# Patient Record
Sex: MALE | Race: WHITE | NOT HISPANIC OR LATINO | Employment: OTHER | URBAN - METROPOLITAN AREA
[De-identification: names, ages, dates, MRNs, and addresses within clinical notes are randomized per-mention and may not be internally consistent; named-entity substitution may affect disease eponyms.]

---

## 2018-02-21 ENCOUNTER — OFFICE VISIT CONVERTED (OUTPATIENT)
Dept: SURGERY | Facility: CLINIC | Age: 68
End: 2018-02-21
Attending: SURGERY

## 2018-04-19 ENCOUNTER — OFFICE VISIT CONVERTED (OUTPATIENT)
Dept: SURGERY | Facility: CLINIC | Age: 68
End: 2018-04-19
Attending: SURGERY

## 2019-01-07 ENCOUNTER — HOSPITAL ENCOUNTER (OUTPATIENT)
Dept: LAB | Facility: HOSPITAL | Age: 69
Discharge: HOME | End: 2019-01-07
Attending: INTERNAL MEDICINE

## 2019-01-07 LAB
ALBUMIN SERPL-MCNC: 4.6 G/DL (ref 3.5–5)
ALBUMIN/GLOB SERPL: 1.4 {RATIO} (ref 1.4–2.6)
ALP SERPL-CCNC: 102 U/L (ref 56–155)
ALT SERPL-CCNC: 38 U/L (ref 10–40)
ANION GAP SERPL CALC-SCNC: 22 MMOL/L (ref 8–19)
AST SERPL-CCNC: 23 U/L (ref 15–50)
BASOPHILS # BLD AUTO: 0.01 10*3/UL (ref 0–0.2)
BASOPHILS NFR BLD AUTO: 0.23 % (ref 0–3)
BILIRUB SERPL-MCNC: 0.43 MG/DL (ref 0.2–1.3)
BUN SERPL-MCNC: 20 MG/DL (ref 5–25)
BUN/CREAT SERPL: 20 {RATIO} (ref 6–20)
CALCIUM SERPL-MCNC: 10 MG/DL (ref 8.7–10.4)
CHLORIDE SERPL-SCNC: 102 MMOL/L (ref 99–111)
CHOLEST SERPL-MCNC: 171 MG/DL (ref 107–200)
CHOLEST/HDLC SERPL: 4.5 {RATIO} (ref 3–6)
CONV CO2: 20 MMOL/L (ref 22–32)
CONV TOTAL PROTEIN: 7.9 G/DL (ref 6.3–8.2)
CREAT UR-MCNC: 0.99 MG/DL (ref 0.7–1.2)
EOSINOPHIL # BLD AUTO: 0.22 10*3/UL (ref 0–0.7)
EOSINOPHIL # BLD AUTO: 3.61 % (ref 0–7)
ERYTHROCYTE [DISTWIDTH] IN BLOOD BY AUTOMATED COUNT: 11.5 % (ref 11.5–14.5)
GFR SERPLBLD BASED ON 1.73 SQ M-ARVRAT: >60 ML/MIN/{1.73_M2}
GLOBULIN UR ELPH-MCNC: 3.3 G/DL (ref 2–3.5)
GLUCOSE SERPL-MCNC: 96 MG/DL (ref 70–99)
HBA1C MFR BLD: 13.5 G/DL (ref 14–18)
HCT VFR BLD AUTO: 38.6 % (ref 42–52)
HDLC SERPL-MCNC: 38 MG/DL (ref 40–60)
LDLC SERPL CALC-MCNC: 67 MG/DL (ref 70–100)
LYMPHOCYTES # BLD AUTO: 1.41 10*3/UL (ref 1–5)
MCH RBC QN AUTO: 31.5 PG (ref 27–31)
MCHC RBC AUTO-ENTMCNC: 35 G/DL (ref 33–37)
MCV RBC AUTO: 90 FL (ref 80–96)
MONOCYTES # BLD AUTO: 0.49 10*3/UL (ref 0.2–1.2)
MONOCYTES NFR BLD AUTO: 8.15 % (ref 3–10)
NEUTROPHILS # BLD AUTO: 3.9 10*3/UL (ref 2–8)
NEUTROPHILS NFR BLD AUTO: 64.6 % (ref 30–85)
NRBC BLD AUTO-RTO: 0 % (ref 0–0.01)
OSMOLALITY SERPL CALC.SUM OF ELEC: 292 MOSM/KG (ref 273–304)
PLATELET # BLD AUTO: 295 10*3/UL (ref 130–400)
PMV BLD AUTO: 6.3 FL (ref 7.4–10.4)
POTASSIUM SERPL-SCNC: 4.4 MMOL/L (ref 3.5–5.3)
PSA SERPL-MCNC: 1.77 NG/ML (ref 0–4)
RBC # BLD AUTO: 4.29 10*6/UL (ref 4.7–6.1)
SODIUM SERPL-SCNC: 140 MMOL/L (ref 135–147)
TRIGL SERPL-MCNC: 332 MG/DL (ref 40–150)
TSH SERPL-ACNC: 2.42 M[IU]/L (ref 0.27–4.2)
VARIANT LYMPHS NFR BLD MANUAL: 23.4 % (ref 20–45)
VLDLC SERPL-MCNC: 66 MG/DL (ref 5–37)
WBC # BLD AUTO: 6.04 10*3/UL (ref 4.8–10.8)

## 2020-03-06 ENCOUNTER — HOSPITAL ENCOUNTER (OUTPATIENT)
Dept: LAB | Facility: HOSPITAL | Age: 70
Discharge: HOME | End: 2020-03-06
Attending: INTERNAL MEDICINE

## 2020-03-06 LAB
ALBUMIN SERPL-MCNC: 4.7 G/DL (ref 3.5–5)
ALBUMIN/GLOB SERPL: 1.5 {RATIO} (ref 1.4–2.6)
ALP SERPL-CCNC: 93 U/L (ref 56–155)
ALT SERPL-CCNC: 58 U/L (ref 10–40)
ANION GAP SERPL CALC-SCNC: 20 MMOL/L (ref 8–19)
AST SERPL-CCNC: 37 U/L (ref 15–50)
BASOPHILS # BLD AUTO: 0.03 10*3/UL (ref 0–0.2)
BASOPHILS NFR BLD AUTO: 0.5 % (ref 0–3)
BILIRUB SERPL-MCNC: 0.39 MG/DL (ref 0.2–1.3)
BUN SERPL-MCNC: 24 MG/DL (ref 5–25)
BUN/CREAT SERPL: 24 {RATIO} (ref 6–20)
CALCIUM SERPL-MCNC: 9.8 MG/DL (ref 8.7–10.4)
CHLORIDE SERPL-SCNC: 101 MMOL/L (ref 99–111)
CHOLEST SERPL-MCNC: 202 MG/DL (ref 107–200)
CHOLEST/HDLC SERPL: 5.6 {RATIO} (ref 3–6)
CONV ABS IMM GRAN: 0.01 10*3/UL (ref 0–0.2)
CONV CO2: 20 MMOL/L (ref 22–32)
CONV IMMATURE GRAN: 0.2 % (ref 0–1.8)
CONV TOTAL PROTEIN: 7.8 G/DL (ref 6.3–8.2)
CREAT UR-MCNC: 1.01 MG/DL (ref 0.7–1.2)
DEPRECATED RDW RBC AUTO: 43.3 FL (ref 35.1–43.9)
EOSINOPHIL # BLD AUTO: 0.3 10*3/UL (ref 0–0.7)
EOSINOPHIL # BLD AUTO: 4.9 % (ref 0–7)
ERYTHROCYTE [DISTWIDTH] IN BLOOD BY AUTOMATED COUNT: 12.5 % (ref 11.6–14.4)
GFR SERPLBLD BASED ON 1.73 SQ M-ARVRAT: >60 ML/MIN/{1.73_M2}
GLOBULIN UR ELPH-MCNC: 3.1 G/DL (ref 2–3.5)
GLUCOSE SERPL-MCNC: 96 MG/DL (ref 70–99)
HCT VFR BLD AUTO: 41.4 % (ref 42–52)
HDLC SERPL-MCNC: 36 MG/DL (ref 40–60)
HGB BLD-MCNC: 13.6 G/DL (ref 14–18)
LDLC SERPL CALC-MCNC: 92 MG/DL (ref 70–100)
LYMPHOCYTES # BLD AUTO: 1.21 10*3/UL (ref 1–5)
LYMPHOCYTES NFR BLD AUTO: 19.8 % (ref 20–45)
MCH RBC QN AUTO: 30.8 PG (ref 27–31)
MCHC RBC AUTO-ENTMCNC: 32.9 G/DL (ref 33–37)
MCV RBC AUTO: 93.9 FL (ref 80–96)
MONOCYTES # BLD AUTO: 0.52 10*3/UL (ref 0.2–1.2)
MONOCYTES NFR BLD AUTO: 8.5 % (ref 3–10)
NEUTROPHILS # BLD AUTO: 4.04 10*3/UL (ref 2–8)
NEUTROPHILS NFR BLD AUTO: 66.1 % (ref 30–85)
NRBC CBCN: 0 % (ref 0–0.7)
OSMOLALITY SERPL CALC.SUM OF ELEC: 288 MOSM/KG (ref 273–304)
PLATELET # BLD AUTO: 214 10*3/UL (ref 130–400)
PMV BLD AUTO: 9.3 FL (ref 9.4–12.4)
POTASSIUM SERPL-SCNC: 4.2 MMOL/L (ref 3.5–5.3)
PSA SERPL-MCNC: 2.02 NG/ML (ref 0–4)
RBC # BLD AUTO: 4.41 10*6/UL (ref 4.7–6.1)
SODIUM SERPL-SCNC: 137 MMOL/L (ref 135–147)
TRIGL SERPL-MCNC: 371 MG/DL (ref 40–150)
VLDLC SERPL-MCNC: 74 MG/DL (ref 5–37)
WBC # BLD AUTO: 6.11 10*3/UL (ref 4.8–10.8)

## 2021-01-15 ENCOUNTER — HOSPITAL ENCOUNTER (OUTPATIENT)
Dept: LAB | Facility: HOSPITAL | Age: 71
Discharge: HOME | End: 2021-01-15
Attending: INTERNAL MEDICINE

## 2021-01-15 LAB
ALBUMIN SERPL-MCNC: 4.5 G/DL (ref 3.5–5)
ALBUMIN/GLOB SERPL: 1.5 {RATIO} (ref 1.4–2.6)
ALP SERPL-CCNC: 87 U/L (ref 56–155)
ALT SERPL-CCNC: 33 U/L (ref 10–40)
ANION GAP SERPL CALC-SCNC: 16 MMOL/L (ref 8–19)
AST SERPL-CCNC: 25 U/L (ref 15–50)
BASOPHILS # BLD AUTO: 0.04 10*3/UL (ref 0–0.2)
BASOPHILS NFR BLD AUTO: 0.8 % (ref 0–3)
BILIRUB SERPL-MCNC: 0.41 MG/DL (ref 0.2–1.3)
BUN SERPL-MCNC: 22 MG/DL (ref 5–25)
BUN/CREAT SERPL: 19 {RATIO} (ref 6–20)
CALCIUM SERPL-MCNC: 10.3 MG/DL (ref 8.7–10.4)
CHLORIDE SERPL-SCNC: 103 MMOL/L (ref 99–111)
CONV ABS IMM GRAN: 0.02 10*3/UL (ref 0–0.2)
CONV CO2: 23 MMOL/L (ref 22–32)
CONV IMMATURE GRAN: 0.4 % (ref 0–1.8)
CONV TOTAL PROTEIN: 7.6 G/DL (ref 6.3–8.2)
CREAT UR-MCNC: 1.15 MG/DL (ref 0.7–1.2)
DEPRECATED RDW RBC AUTO: 44.1 FL (ref 35.1–43.9)
EOSINOPHIL # BLD AUTO: 0.23 10*3/UL (ref 0–0.7)
EOSINOPHIL # BLD AUTO: 4.6 % (ref 0–7)
ERYTHROCYTE [DISTWIDTH] IN BLOOD BY AUTOMATED COUNT: 12.9 % (ref 11.6–14.4)
GFR SERPLBLD BASED ON 1.73 SQ M-ARVRAT: >60 ML/MIN/{1.73_M2}
GLOBULIN UR ELPH-MCNC: 3.1 G/DL (ref 2–3.5)
GLUCOSE SERPL-MCNC: 99 MG/DL (ref 70–99)
HCT VFR BLD AUTO: 40.5 % (ref 42–52)
HGB BLD-MCNC: 13.5 G/DL (ref 14–18)
IRON SATN MFR SERPL: 21 % (ref 20–55)
IRON SERPL-MCNC: 88 UG/DL (ref 70–180)
LYMPHOCYTES # BLD AUTO: 1.16 10*3/UL (ref 1–5)
LYMPHOCYTES NFR BLD AUTO: 23.4 % (ref 20–45)
MCH RBC QN AUTO: 31 PG (ref 27–31)
MCHC RBC AUTO-ENTMCNC: 33.3 G/DL (ref 33–37)
MCV RBC AUTO: 93.1 FL (ref 80–96)
MONOCYTES # BLD AUTO: 0.51 10*3/UL (ref 0.2–1.2)
MONOCYTES NFR BLD AUTO: 10.3 % (ref 3–10)
NEUTROPHILS # BLD AUTO: 2.99 10*3/UL (ref 2–8)
NEUTROPHILS NFR BLD AUTO: 60.5 % (ref 30–85)
NRBC CBCN: 0 % (ref 0–0.7)
OSMOLALITY SERPL CALC.SUM OF ELEC: 289 MOSM/KG (ref 273–304)
PLATELET # BLD AUTO: 177 10*3/UL (ref 130–400)
PMV BLD AUTO: 9.7 FL (ref 9.4–12.4)
POTASSIUM SERPL-SCNC: 4.2 MMOL/L (ref 3.5–5.3)
RBC # BLD AUTO: 4.35 10*6/UL (ref 4.7–6.1)
SODIUM SERPL-SCNC: 138 MMOL/L (ref 135–147)
TIBC SERPL-MCNC: 423 UG/DL (ref 245–450)
TRANSFERRIN SERPL-MCNC: 296 MG/DL (ref 215–365)
WBC # BLD AUTO: 4.95 10*3/UL (ref 4.8–10.8)

## 2021-01-16 LAB
CERULOPLASMIN SERPL-MCNC: 24.4 MG/DL (ref 16–31)
HCV AB SER DONR QL: <0.1 S/CO RATIO (ref 0–0.9)
SMOOTH MUSCLE F-ACTIN AB IGG: 6 UNITS (ref 0–19)

## 2021-01-19 LAB — CONV HEPATITIS B SURFACE AG W CONFIRMATION RE: NEGATIVE

## 2021-02-25 ENCOUNTER — CONVERSION ENCOUNTER (OUTPATIENT)
Dept: GASTROENTEROLOGY | Facility: CLINIC | Age: 71
End: 2021-02-25
Attending: INTERNAL MEDICINE

## 2021-03-10 ENCOUNTER — HOSPITAL ENCOUNTER (OUTPATIENT)
Dept: LAB | Facility: HOSPITAL | Age: 71
Discharge: HOME | End: 2021-03-10
Attending: INTERNAL MEDICINE

## 2021-03-11 LAB — PSA SERPL-MCNC: 2.25 NG/ML (ref 0–4)

## 2021-03-30 ENCOUNTER — OFFICE VISIT CONVERTED (OUTPATIENT)
Dept: SURGERY | Facility: CLINIC | Age: 71
End: 2021-03-30
Attending: NURSE PRACTITIONER

## 2021-05-10 NOTE — H&P
History and Physical      Patient Name: Herb Dimas   Patient ID: [id number]   Sex: Male   YOB: 1950    Referring Provider: Clemente Park MD    Visit Date: February 25, 2021    Provider: Bryn Adamson MD   Location: [facility]   Location Address: [address] [place] [address]  [id number]   Location Phone: [phone]          Chief Complaint  · Surgical History and Physical  · Screening Colonoscopy      History Of Present Illness  NON-INPATIENT HISTORY AND PHYSICAL  Allergies: NO KNOWN DRUG ALLERGIES   Chief Complaint/History of Present Illness: Colon Screening, History of colon polyps, No Family history of colon caner   Colon Recall: No   Failed Outpatient Treatment/Contraindications: N/A   Current Medications: amlodipine 5 mg oral tablet, aspirin 81 mg oral tablet,delayed release (DR/EC), chlorthalidone 25 mg oral tablet, irbesartan 300 mg oral tablet, NuLYTELY with Flavor Packs 420 gram oral recon soln, rosuvastatin 20 mg oral tablet, and Vitamin D3 oral   Significant Past Medical History: Arthritis, High blood pressure, and High cholesterol   Significant Family Medical History: No Family history of colon cancer   Significant Past Surgical History: Appendectomy, Colonoscopy, and Knee Replacement   Previous Colonoscopy: Yes YEAR: By Whom:   Previous EGD: Yes YEAR: 2000 By Whom:   PHYSICAL EXAM:  Heart: Regular Rate and Rhythm   Lungs: Breathing Unlabored           Assessment  · Preoperative examination     V72.84/Z01.818  · Screening for colon cancer     V76.51/Z12.11  · History of colon polyps     V12.72/Z86.010      Plan  · Orders  o Consent for Colonoscopy with Possible Biopsy - Possible risks/complications, benefits, and alternatives to surgical or invasive procedure have been explained to patient and/or legal guardian. -Patient has been evaluated and can tolerate anesthesia and/or sedation. Risks, benefits, and alternatives to anesthesia and sedation  have been explained to patient and/or legal guardian. ([id number]) - V72.84/Z01.818, V76.51/Z12.11, V12.72/Z86.010 - 04/21/2021  · Medications  o Medications have been Reconciled  o Transition of Care or Provider Policy  · Instructions  o ****Surgical Orders****  o ***************  o Outpatient  o ***************  o RISK AND BENEFITS:  o Possible risks/complications, benefits and alternatives to surgical or invasive procedure have been explained to the patient and/or legal guardian.  o Patient has been evaluated and can tolerate anesthesia and/or sedation. Risks, benefits, and alternatives to anesthesia and sedation have been explained to the patient and/or legal guardian.  o ***************  o PREP: Per protocol  o IV: Per Anesthesia  o The above History and Physical Examination has been completed within 30 days of admission.  o This note has been transcribed by ANDREA Arreola. I have read and agree with the findings in this note.  o Electronically Identified Patient Education Materials Provided Electronically  · Disposition  o Call or Return if symptoms worsen or persist.            Electronically Signed by: Caron Canada MA -Author on February 25, 2021 09:50:36 AM

## 2021-05-10 NOTE — H&P
"   History and Physical      Patient Name: Herb Dimas   Patient ID: [id number]   Sex: Male   YOB: 1950    Referring Provider: Clemente Park MD    Visit Date: March 30, 2021    Provider: GLORIA Barrow   Location: [facility]   Location Address: [address] [place] [address]  [id number]   Location Phone: [phone]          Chief Complaint  · \"My doctor sent me because my blood test is high\"      History Of Present Illness  The patient is a 70 year old /White male, who presents on referral from Clemente Park MD, for an urological evaluation for rising psa.   The patient also reports nocturia, 1-2 times a night. Additionally, he denies burning, chills, fever, frequency, hematuria, hesitancy, incomplete emptying, previous UTI's, and slowing of his stream.   The patient is currently not taking any Urology specific medications.   The patient's past medical history is non-contributory.   Petinent studies:  To date, no diagnostic studies have been performed. He has not had any recent care for this condition.      Patient denies any urinary frequency, urgency or dysuria.    Denies any UTIs over the last year.    Admits to nocturia 1-2 times a week.    Admits to good urinary stream.  Denies any urinary spitting.    Denies any penis or scrotum pain.  Denies any ED issues.    Patient does have a family history of prostate cancer with his brother.    Previous psa's:  3/21: 2.25  3/20: 2.02       Past Medical History  Disease Name Date Onset Notes   Arthritis --  --    High blood pressure --  --    High cholesterol --  --          Past Surgical History  Procedure Name Date Notes   *Metal Implant --  --    Appendectomy --  --    Colonoscopy --  --    Knee Replacement --  --          Medication List  Name Date Started Instructions   amlodipine 5 mg oral tablet  take 1 tablet (5 mg) by oral route once daily   aspirin 81 mg oral tablet,delayed release (/EC)  " take 1 tablet (81 mg) by oral route once daily   chlorthalidone 25 mg oral tablet  take 1 tablet (25 mg) by oral route once daily   irbesartan 300 mg oral tablet  take 1 tablet (300 mg) by oral route once daily   multivitamin oral tablet  take 1 tablet by oral route once   NuLYTELY with Flavor Packs 420 gram oral recon soln 02/25/2021 take as directed for bowel prep   rosuvastatin 20 mg oral tablet  take 1 tablet (20 mg) by oral route once daily         Allergy List  Allergen Name Date Reaction Notes   NO KNOWN DRUG ALLERGIES --  --  --        Allergies Reconciled  Family Medical History  Disease Name Relative/Age Notes   Diabetes, unspecified type Father/   Father   Family history of breast cancer Sister/60s   Sister/60s         Social History  Finding Status Start/Stop Quantity Notes   Alcohol Current some day --/-- --  drinks weekly; beer   Caffeine Current every day --/-- --  drinks regularly; 1-2 times per day   Second hand smoke exposure Never --/-- --  no   Tobacco Former 1/24 --  former smoker; started smoking at age 2; quit smoking at age 25; smoked 20 cigarette(s) per day         Review of Systems  · Constitutional  o Denies  o : fever, chills  · HENT  o Denies  o : sore throat, nasal congestion, nasal discharge, sinus pain, headaches  · Cardiovascular  o Denies  o : chest pain, cardiac murmurs, irregular heart beats, dyspnea on exertion  · Respiratory  o Denies  o : shortness of breath, wheezing  · Gastrointestinal  o Denies  o : nausea, vomiting, change in abdominal girth, diarrhea, constipation, blood in stools  · Genitourinary  o Admits  o : nocturia  o Denies  o : urgency, frequency, dysuria, hematuria, pyuria, oliguria, change in urine color, incontinence, urinary retention, difficulty voiding, urinary hesitancy, decreased stream, post-void dribbling, scrotal pain, additional symptoms, except as noted in HPI  · Integument  o Denies  o : rash, itching, new skin lesions  · Neurologic  o Denies  o :  tingling or numbness, incoordination, seizures  · Endocrine  o Denies  o : polyuria, polydipsia, cold intolerance, heat intolerance, weight gain, weight loss  · Psychiatric  o Denies  o : anxiety, depression      Vitals  Date Time BP Position Site L\R Cuff Size HR RR TEMP (F) WT  HT  BMI kg/m2 BSA m2 O2 Sat FR L/min FiO2 HC       03/30/2021 11:29 AM       16  231lbs 16oz 6'   31.46 2.31             Physical Examination  · Constitutional  o Appearance  o : Well nourished, well developed patient in no acute distress. Ambulating without difficulty.  · Neck  o Thyroid  o : Normal size without tenderness, nodules or masses  · Respiratory  o Respiratory Effort  o : Breathing is unlabored without accessory muscle use  o Auscultation of Lungs  o : Normal breath sounds  · Gastrointestinal  o Abdominal Examination  o : Scaphoid abdomen which is non-tender to palpation with normal tone and without rigidity or guarding. Normal bowel sounds. No masses present.  o Liver and spleen  o : No hepatomegaly present. Liver is non-tender to palpation and spleen is not palpable.  o Hernias  o : No abdominal wall hernias are present.  · Genitourinary  o Penis  o : Normal appearance without lesion, discharge or masses.  · Lymphatic  o Neck  o : No lymphadenopathy present  o Axilla  o : No lymphadenopathy present  o Groin  o : No lymphadenopathy present  · Skin and Subcutaneous Tissue  o General Inspection  o : No rashes, lesions or areas of discoloration present. Skin turgor is normal.  o General Palpation  o : No abnormalities, masses or tenderness on palpation.              Assessment  · Nocturia     788.43/R35.1  · Family history of prostate cancer     V16.42/Z80.42    Problems Reconciled  Plan  · Orders  o PSA ultrasensitive DIAGNOSTIC [facility] ([id number]) - V16.42/Z80.42 - 09/30/2021  · Medications  o Medications have been Reconciled  o Transition of Care or Provider Policy  · Instructions  o DISCUSSION AND PLAN: I have discussed with the  patient that there is no PSA level that can indicate that he has prostate cancer cancer. The only way to confirm is with a prostate biopsy.  o The patient's PSA is elevated on initial exam. I have discussed the causes of an elevated PSA. I have made recommendations and I have the patient return in follow-up for a recheck of the PSA.  o And is with a rising PSA currently 2.25. I have educated the patient we will continue to monitor and repeat his PSA in 6 months.  o Electronically Identified Patient Education Materials Provided Electronically  · Disposition  o Call or Return if symptoms worsen or persist.            Electronically Signed by: GLORIA Barrow -Author on March 31, 2021 09:01:22 AM

## 2021-05-14 VITALS — BODY MASS INDEX: 31.42 KG/M2 | HEIGHT: 72 IN | WEIGHT: 232 LBS | RESPIRATION RATE: 16 BRPM

## 2021-05-16 VITALS — RESPIRATION RATE: 16 BRPM | WEIGHT: 220 LBS | HEIGHT: 73 IN | BODY MASS INDEX: 29.16 KG/M2

## 2021-05-16 VITALS — WEIGHT: 220 LBS | HEIGHT: 72 IN | RESPIRATION RATE: 16 BRPM | BODY MASS INDEX: 29.8 KG/M2

## 2021-05-18 ENCOUNTER — HOSPITAL ENCOUNTER (OUTPATIENT)
Dept: GASTROENTEROLOGY | Facility: HOSPITAL | Age: 71
Setting detail: HOSPITAL OUTPATIENT SURGERY
Discharge: HOME | End: 2021-05-18
Attending: INTERNAL MEDICINE

## 2021-10-04 ENCOUNTER — TRANSCRIBE ORDERS (OUTPATIENT)
Dept: SURGERY | Facility: CLINIC | Age: 71
End: 2021-10-04

## 2021-10-04 ENCOUNTER — LAB (OUTPATIENT)
Dept: LAB | Facility: HOSPITAL | Age: 71
End: 2021-10-04

## 2021-10-04 DIAGNOSIS — R97.20 ELEVATED PROSTATE SPECIFIC ANTIGEN (PSA): Primary | ICD-10-CM

## 2021-10-04 DIAGNOSIS — Z80.42 FAMILY HISTORY OF MALIGNANT NEOPLASM OF PROSTATE: ICD-10-CM

## 2021-10-04 DIAGNOSIS — R97.20 ELEVATED PROSTATE SPECIFIC ANTIGEN (PSA): ICD-10-CM

## 2021-10-04 LAB — PSA SERPL-MCNC: 2.65 NG/ML (ref 0–4)

## 2021-10-04 PROCEDURE — 36415 COLL VENOUS BLD VENIPUNCTURE: CPT

## 2021-10-04 PROCEDURE — 84153 ASSAY OF PSA TOTAL: CPT

## 2021-10-06 ENCOUNTER — OFFICE VISIT (OUTPATIENT)
Dept: UROLOGY | Facility: CLINIC | Age: 71
End: 2021-10-06

## 2021-10-06 VITALS — WEIGHT: 230.6 LBS | BODY MASS INDEX: 31.23 KG/M2 | HEART RATE: 60 BPM | HEIGHT: 72 IN

## 2021-10-06 DIAGNOSIS — R35.1 BPH ASSOCIATED WITH NOCTURIA: Primary | ICD-10-CM

## 2021-10-06 DIAGNOSIS — N40.1 BPH ASSOCIATED WITH NOCTURIA: Primary | ICD-10-CM

## 2021-10-06 PROCEDURE — 99212 OFFICE O/P EST SF 10 MIN: CPT | Performed by: NURSE PRACTITIONER

## 2021-10-06 RX ORDER — ASPIRIN 81 MG/1
81 TABLET, CHEWABLE ORAL EVERY 24 HOURS
COMMUNITY
End: 2023-02-23 | Stop reason: HOSPADM

## 2021-10-06 RX ORDER — ASPIRIN 81 MG/1
TABLET ORAL EVERY 24 HOURS
COMMUNITY
End: 2022-02-08

## 2021-10-06 RX ORDER — CHLORTHALIDONE 25 MG/1
TABLET ORAL
COMMUNITY
End: 2022-02-08

## 2021-10-06 RX ORDER — IRBESARTAN 300 MG/1
TABLET ORAL
COMMUNITY
End: 2022-05-09

## 2021-10-06 RX ORDER — ROSUVASTATIN CALCIUM 20 MG/1
TABLET, COATED ORAL
COMMUNITY
End: 2022-05-09

## 2021-10-06 RX ORDER — CHLORTHALIDONE 25 MG/1
TABLET ORAL
COMMUNITY
End: 2022-05-09

## 2021-10-06 RX ORDER — IRBESARTAN 300 MG/1
TABLET ORAL EVERY 24 HOURS
COMMUNITY
End: 2022-02-08 | Stop reason: SDUPTHER

## 2021-10-06 RX ORDER — AMLODIPINE BESYLATE 5 MG/1
TABLET ORAL
COMMUNITY
Start: 2021-04-28 | End: 2021-10-11

## 2021-10-06 NOTE — PROGRESS NOTES
Chief Complaint: Elevated PSA (PT stated he was referred here for elevated PSA and he is ollowing up from the last appointment here ) and Follow-up    Subjective      Rising PSA         History of Present Illness  Herb Dimas is a 70 y.o. male presents to [facility] for rising PSA.    I seen this patient March 30 of 2021 for a rising PSA.    Patient at that time was reporting nocturia 1-2 times a night.    Currently denies any urinary frequency, urgency or dysuria.    Denies any UTIs over the last year.    Admits to nocturia 1X/night.    Admits to a good urinary stream.  Denies any urinary spitting.    Denies any penis or scrotum pain.  Denies any ED issues.    Admits to family history of prostate cancer with his brother.    Today his PSA is 2.65.    Previous psa's:  3/21: 2.25  3/20: 2.02  1/19: 1.77    Objective     Past Medical History:   Diagnosis Date   • Arthritis    • High blood pressure    • High cholesterol        Past Surgical History:   Procedure Laterality Date   • APPENDECTOMY     • COLONOSCOPY  2021   • OTHER SURGICAL HISTORY      metal implants   • REPLACEMENT TOTAL KNEE           Current Outpatient Medications:   •  amLODIPine (NORVASC) 5 MG tablet, , Disp: , Rfl:   •  aspirin (ASPIR) 81 MG EC tablet, Daily., Disp: , Rfl:   •  aspirin 81 MG chewable tablet, Daily., Disp: , Rfl:   •  chlorthalidone (HYGROTON) 25 MG tablet, TAKE 1 TABLET EVERY DAY, Disp: , Rfl:   •  chlorthalidone (HYGROTON) 25 MG tablet, TAKE 1 TABLET EVERY DAY, Disp: , Rfl:   •  irbesartan (AVAPRO) 300 MG tablet, Daily., Disp: , Rfl:   •  irbesartan (AVAPRO) 300 MG tablet, TAKE 1 TABLET ONE TIME DAILY  FOR  90  DAYS, Disp: , Rfl:   •  rosuvastatin (CRESTOR) 20 MG tablet, 1 tablet, Disp: , Rfl:     No Known Allergies     Family History   Problem Relation Age of Onset   • Diabetes Father    • Breast cancer Sister         60s       Social History     Socioeconomic History   • Marital status:       "Spouse name: Not on file   • Number of children: Not on file   • Years of education: Not on file   • Highest education level: Not on file   Tobacco Use   • Smoking status: Former Smoker   • Smokeless tobacco: Never Used   • Tobacco comment: started smoking at age 2; quit smoking at age 25 smiked 20 per day   Vaping Use   • Vaping Use: Never used   Substance and Sexual Activity   • Alcohol use: Yes     Comment: current some day drinks weekly beer   • Drug use: Never       Review of Systems     Vital Signs:   Pulse 60   Ht 182.9 cm (72\")   Wt 105 kg (230 lb 9.6 oz)   BMI 31.27 kg/m²      Physical Exam  Constitutional:       Appearance: Normal appearance.   Cardiovascular:      Rate and Rhythm: Normal rate.   Pulmonary:      Effort: Pulmonary effort is normal.   Abdominal:      General: Abdomen is flat.      Palpations: Abdomen is soft.   Skin:     General: Skin is warm and dry.   Neurological:      General: No focal deficit present.      Mental Status: He is alert and oriented to person, place, and time.   Psychiatric:         Mood and Affect: Mood normal.         Behavior: Behavior normal.          Result Review :              [x]  Laboratory  []  Radiology  []  Pathology  []  Microbiology  []  EKG/Telemetry   []  Cardiology/Vascular   [x]  Old records       Assessment and Plan    Diagnoses and all orders for this visit:    1. BPH associated with nocturia (Primary)  -     PSA Diagnostic; Future        Follow Up   Return for Repeat PSA in 6 months; follow-up in the office post labwork.     Patient was given instructions and counseling regarding his condition or for health maintenance advice. Please see specific information pulled into the AVS if appropriate.           "

## 2021-10-11 RX ORDER — AMLODIPINE BESYLATE 5 MG/1
TABLET ORAL
Qty: 90 TABLET | Refills: 0 | Status: SHIPPED | OUTPATIENT
Start: 2021-10-11 | End: 2022-02-02

## 2022-02-02 DIAGNOSIS — Z79.4 TYPE 2 DIABETES MELLITUS WITH HYPERGLYCEMIA, WITH LONG-TERM CURRENT USE OF INSULIN: ICD-10-CM

## 2022-02-02 DIAGNOSIS — E78.5 HYPERLIPIDEMIA, UNSPECIFIED HYPERLIPIDEMIA TYPE: Primary | ICD-10-CM

## 2022-02-02 DIAGNOSIS — E11.65 TYPE 2 DIABETES MELLITUS WITH HYPERGLYCEMIA, WITH LONG-TERM CURRENT USE OF INSULIN: ICD-10-CM

## 2022-02-02 RX ORDER — AMLODIPINE BESYLATE 5 MG/1
TABLET ORAL
Qty: 90 TABLET | Refills: 3 | Status: SHIPPED | OUTPATIENT
Start: 2022-02-02 | End: 2022-12-05

## 2022-02-03 ENCOUNTER — LAB (OUTPATIENT)
Dept: LAB | Facility: HOSPITAL | Age: 72
End: 2022-02-03

## 2022-02-03 DIAGNOSIS — Z79.4 TYPE 2 DIABETES MELLITUS WITH HYPERGLYCEMIA, WITH LONG-TERM CURRENT USE OF INSULIN: ICD-10-CM

## 2022-02-03 DIAGNOSIS — E11.65 TYPE 2 DIABETES MELLITUS WITH HYPERGLYCEMIA, WITH LONG-TERM CURRENT USE OF INSULIN: ICD-10-CM

## 2022-02-03 DIAGNOSIS — E78.5 HYPERLIPIDEMIA, UNSPECIFIED HYPERLIPIDEMIA TYPE: ICD-10-CM

## 2022-02-03 LAB
ALBUMIN SERPL-MCNC: 4.7 G/DL (ref 3.5–5.2)
ALBUMIN/GLOB SERPL: 1.4 G/DL
ALP SERPL-CCNC: 90 U/L (ref 39–117)
ALT SERPL W P-5'-P-CCNC: 23 U/L (ref 1–41)
ANION GAP SERPL CALCULATED.3IONS-SCNC: 10.5 MMOL/L (ref 5–15)
AST SERPL-CCNC: 23 U/L (ref 1–40)
BASOPHILS # BLD AUTO: 0.03 10*3/MM3 (ref 0–0.2)
BASOPHILS NFR BLD AUTO: 0.5 % (ref 0–1.5)
BILIRUB SERPL-MCNC: 0.5 MG/DL (ref 0–1.2)
BUN SERPL-MCNC: 19 MG/DL (ref 8–23)
BUN/CREAT SERPL: 17.8 (ref 7–25)
CALCIUM SPEC-SCNC: 10.1 MG/DL (ref 8.6–10.5)
CHLORIDE SERPL-SCNC: 104 MMOL/L (ref 98–107)
CHOLEST SERPL-MCNC: 206 MG/DL (ref 0–200)
CO2 SERPL-SCNC: 22.5 MMOL/L (ref 22–29)
CREAT SERPL-MCNC: 1.07 MG/DL (ref 0.76–1.27)
DEPRECATED RDW RBC AUTO: 43.5 FL (ref 37–54)
EOSINOPHIL # BLD AUTO: 0.22 10*3/MM3 (ref 0–0.4)
EOSINOPHIL NFR BLD AUTO: 3.8 % (ref 0.3–6.2)
ERYTHROCYTE [DISTWIDTH] IN BLOOD BY AUTOMATED COUNT: 13.2 % (ref 12.3–15.4)
GFR SERPL CREATININE-BSD FRML MDRD: 68 ML/MIN/1.73
GLOBULIN UR ELPH-MCNC: 3.3 GM/DL
GLUCOSE SERPL-MCNC: 107 MG/DL (ref 65–99)
HCT VFR BLD AUTO: 39.9 % (ref 37.5–51)
HDLC SERPL-MCNC: 50 MG/DL (ref 40–60)
HGB BLD-MCNC: 13.9 G/DL (ref 13–17.7)
IMM GRANULOCYTES # BLD AUTO: 0.02 10*3/MM3 (ref 0–0.05)
IMM GRANULOCYTES NFR BLD AUTO: 0.3 % (ref 0–0.5)
LDLC SERPL CALC-MCNC: 113 MG/DL (ref 0–100)
LDLC/HDLC SERPL: 2.13 {RATIO}
LYMPHOCYTES # BLD AUTO: 1.12 10*3/MM3 (ref 0.7–3.1)
LYMPHOCYTES NFR BLD AUTO: 19.5 % (ref 19.6–45.3)
MCH RBC QN AUTO: 31.4 PG (ref 26.6–33)
MCHC RBC AUTO-ENTMCNC: 34.8 G/DL (ref 31.5–35.7)
MCV RBC AUTO: 90.1 FL (ref 79–97)
MONOCYTES # BLD AUTO: 0.59 10*3/MM3 (ref 0.1–0.9)
MONOCYTES NFR BLD AUTO: 10.3 % (ref 5–12)
NEUTROPHILS NFR BLD AUTO: 3.76 10*3/MM3 (ref 1.7–7)
NEUTROPHILS NFR BLD AUTO: 65.6 % (ref 42.7–76)
NRBC BLD AUTO-RTO: 0 /100 WBC (ref 0–0.2)
PLATELET # BLD AUTO: 168 10*3/MM3 (ref 140–450)
PMV BLD AUTO: 9.3 FL (ref 6–12)
POTASSIUM SERPL-SCNC: 4.2 MMOL/L (ref 3.5–5.2)
PROT SERPL-MCNC: 8 G/DL (ref 6–8.5)
RBC # BLD AUTO: 4.43 10*6/MM3 (ref 4.14–5.8)
SODIUM SERPL-SCNC: 137 MMOL/L (ref 136–145)
TRIGL SERPL-MCNC: 248 MG/DL (ref 0–150)
VLDLC SERPL-MCNC: 43 MG/DL (ref 5–40)
WBC NRBC COR # BLD: 5.74 10*3/MM3 (ref 3.4–10.8)

## 2022-02-03 PROCEDURE — 80061 LIPID PANEL: CPT

## 2022-02-03 PROCEDURE — 80053 COMPREHEN METABOLIC PANEL: CPT

## 2022-02-03 PROCEDURE — 36415 COLL VENOUS BLD VENIPUNCTURE: CPT

## 2022-02-03 PROCEDURE — 85025 COMPLETE CBC W/AUTO DIFF WBC: CPT

## 2022-02-08 ENCOUNTER — OFFICE VISIT (OUTPATIENT)
Dept: INTERNAL MEDICINE | Facility: CLINIC | Age: 72
End: 2022-02-08

## 2022-02-08 VITALS
HEART RATE: 59 BPM | HEIGHT: 72 IN | WEIGHT: 236.4 LBS | SYSTOLIC BLOOD PRESSURE: 158 MMHG | BODY MASS INDEX: 32.02 KG/M2 | OXYGEN SATURATION: 93 % | TEMPERATURE: 97.7 F | DIASTOLIC BLOOD PRESSURE: 87 MMHG

## 2022-02-08 DIAGNOSIS — E55.9 VITAMIN D DEFICIENCY: ICD-10-CM

## 2022-02-08 DIAGNOSIS — M17.0 PRIMARY OSTEOARTHRITIS OF BOTH KNEES: ICD-10-CM

## 2022-02-08 DIAGNOSIS — E78.5 HYPERLIPIDEMIA, UNSPECIFIED HYPERLIPIDEMIA TYPE: Primary | ICD-10-CM

## 2022-02-08 DIAGNOSIS — Z12.5 SCREENING PSA (PROSTATE SPECIFIC ANTIGEN): ICD-10-CM

## 2022-02-08 DIAGNOSIS — R74.8 ELEVATED LIVER ENZYMES: ICD-10-CM

## 2022-02-08 DIAGNOSIS — I10 HYPERTENSION, ESSENTIAL: ICD-10-CM

## 2022-02-08 DIAGNOSIS — R73.01 IFG (IMPAIRED FASTING GLUCOSE): ICD-10-CM

## 2022-02-08 DIAGNOSIS — E78.2 MIXED HYPERLIPIDEMIA: ICD-10-CM

## 2022-02-08 DIAGNOSIS — I82.462 DEEP VEIN THROMBOSIS (DVT) OF CALF MUSCLE VEIN OF LEFT LOWER EXTREMITY, UNSPECIFIED CHRONICITY: ICD-10-CM

## 2022-02-08 PROCEDURE — 99214 OFFICE O/P EST MOD 30 MIN: CPT | Performed by: INTERNAL MEDICINE

## 2022-02-08 RX ORDER — VALACYCLOVIR HYDROCHLORIDE 1 G/1
TABLET, FILM COATED ORAL AS NEEDED
Status: ON HOLD | COMMUNITY
Start: 2022-02-03 | End: 2023-02-21

## 2022-02-08 RX ORDER — SILDENAFIL 50 MG/1
50 TABLET, FILM COATED ORAL DAILY PRN
Qty: 10 TABLET | Refills: 3 | Status: SHIPPED | OUTPATIENT
Start: 2022-02-08

## 2022-02-08 NOTE — PROGRESS NOTES
"Chief Complaint/ HPI: F/ UWITH HTN AND OA, WALKING MORE UP TO 3 MILES  CHOL MEDS NOTED   NO CHEST PAIN   NO FALLS   Patient did follow-up with urology for increasing PSAs has another check in March or April prior to his visit with urology at that time,          Objective   Vital Signs  Vitals:    02/08/22 1452 02/08/22 1456   BP: 164/85 158/87   Pulse: 59    Temp: 97.7 °F (36.5 °C)    SpO2: 93%    Weight: 107 kg (236 lb 6.4 oz)    Height: 182.9 cm (72.01\")       Body mass index is 32.05 kg/m².  Review of Systems   Constitutional: Negative.    HENT: Negative.    Eyes: Negative.    Respiratory: Negative.    Cardiovascular: Negative.    Gastrointestinal: Negative.    Endocrine: Negative.    Genitourinary: Negative.    Musculoskeletal: Negative.    Allergic/Immunologic: Negative.    Neurological: Negative.    Hematological: Negative.    Psychiatric/Behavioral: Negative.       Physical Exam  Constitutional:       General: He is not in acute distress.     Appearance: Normal appearance.   HENT:      Head: Normocephalic.      Mouth/Throat:      Mouth: Mucous membranes are moist.   Eyes:      Conjunctiva/sclera: Conjunctivae normal.      Pupils: Pupils are equal, round, and reactive to light.   Cardiovascular:      Rate and Rhythm: Normal rate and regular rhythm.      Pulses: Normal pulses.      Heart sounds: Normal heart sounds.   Pulmonary:      Effort: Pulmonary effort is normal.      Breath sounds: Normal breath sounds.   Abdominal:      General: Abdomen is flat. Bowel sounds are normal.      Palpations: Abdomen is soft.   Musculoskeletal:         General: No swelling. Normal range of motion.      Cervical back: Neck supple.   Skin:     General: Skin is warm and dry.      Coloration: Skin is not jaundiced.   Neurological:      General: No focal deficit present.      Mental Status: He is alert and oriented to person, place, and time. Mental status is at baseline.   Psychiatric:         Mood and Affect: Mood normal.        "  Behavior: Behavior normal.         Thought Content: Thought content normal.         Judgment: Judgment normal.        Result Review :   No results found for: PROBNP, BNP  CMP    CMP 2/3/22   Glucose 107 (A)   BUN 19   Creatinine 1.07   eGFR Non African Am 68   Sodium 137   Potassium 4.2   Chloride 104   Calcium 10.1   Albumin 4.70   Total Bilirubin 0.5   Alkaline Phosphatase 90   AST (SGOT) 23   ALT (SGPT) 23   (A) Abnormal value            CBC w/diff    CBC w/Diff 2/3/22   WBC 5.74   RBC 4.43   Hemoglobin 13.9   Hematocrit 39.9   MCV 90.1   MCH 31.4   MCHC 34.8   RDW 13.2   Platelets 168   Neutrophil Rel % 65.6   Immature Granulocyte Rel % 0.3   Lymphocyte Rel % 19.5 (A)   Monocyte Rel % 10.3   Eosinophil Rel % 3.8   Basophil Rel % 0.5   (A) Abnormal value             Lipid Panel    Lipid Panel 2/3/22   Total Cholesterol 206 (A)   Triglycerides 248 (A)   HDL Cholesterol 50   VLDL Cholesterol 43 (A)   LDL Cholesterol  113 (A)   LDL/HDL Ratio 2.13   (A) Abnormal value             Lab Results   Component Value Date    TSH 2.420 01/07/2019      No results found for: FREET4      PSA    PSA 3/10/21 10/4/21   PSA 2.25 2.650                          Visit Diagnoses:    ICD-10-CM ICD-9-CM   1. Hyperlipidemia, unspecified hyperlipidemia type  E78.5 272.4   2. Elevated liver enzymes  R74.8 790.5   3. Hypertension, essential  I10 401.9   4. Vitamin D deficiency  E55.9 268.9   5. Deep vein thrombosis (DVT) of calf muscle vein of left lower extremity, unspecified chronicity (HCC)  I82.462 453.42   6. Mixed hyperlipidemia  E78.2 272.2   7. Primary osteoarthritis of both knees  M17.0 715.16   8. Screening PSA (prostate specific antigen)  Z12.5 V76.44   9. IFG (impaired fasting glucose)  R73.01 790.21       Assessment and Plan   Diagnoses and all orders for this visit:    1. Hyperlipidemia, unspecified hyperlipidemia type (Primary)  -     Hemoglobin A1c; Future  -     Comprehensive Metabolic Panel; Future  -     CBC &  Differential; Future  -     PSA Screen; Future  -     Lipid Panel; Future  -     Vitamin D 25 Hydroxy; Future  -     sildenafil (Viagra) 50 MG tablet; Take 1 tablet by mouth Daily As Needed for Erectile Dysfunction.  Dispense: 10 tablet; Refill: 3    2. Elevated liver enzymes  -     Hemoglobin A1c; Future  -     Comprehensive Metabolic Panel; Future  -     CBC & Differential; Future  -     PSA Screen; Future  -     Lipid Panel; Future  -     Vitamin D 25 Hydroxy; Future  -     sildenafil (Viagra) 50 MG tablet; Take 1 tablet by mouth Daily As Needed for Erectile Dysfunction.  Dispense: 10 tablet; Refill: 3    3. Hypertension, essential  -     Hemoglobin A1c; Future  -     Comprehensive Metabolic Panel; Future  -     CBC & Differential; Future  -     PSA Screen; Future  -     Lipid Panel; Future  -     Vitamin D 25 Hydroxy; Future  -     sildenafil (Viagra) 50 MG tablet; Take 1 tablet by mouth Daily As Needed for Erectile Dysfunction.  Dispense: 10 tablet; Refill: 3    4. Vitamin D deficiency  -     Hemoglobin A1c; Future  -     Comprehensive Metabolic Panel; Future  -     CBC & Differential; Future  -     PSA Screen; Future  -     Lipid Panel; Future  -     Vitamin D 25 Hydroxy; Future  -     sildenafil (Viagra) 50 MG tablet; Take 1 tablet by mouth Daily As Needed for Erectile Dysfunction.  Dispense: 10 tablet; Refill: 3    5. Deep vein thrombosis (DVT) of calf muscle vein of left lower extremity, unspecified chronicity (HCC)  -     Hemoglobin A1c; Future  -     Comprehensive Metabolic Panel; Future  -     CBC & Differential; Future  -     PSA Screen; Future  -     Lipid Panel; Future  -     Vitamin D 25 Hydroxy; Future  -     sildenafil (Viagra) 50 MG tablet; Take 1 tablet by mouth Daily As Needed for Erectile Dysfunction.  Dispense: 10 tablet; Refill: 3    6. Mixed hyperlipidemia  -     Hemoglobin A1c; Future  -     Comprehensive Metabolic Panel; Future  -     CBC & Differential; Future  -     PSA Screen; Future  -      Lipid Panel; Future  -     Vitamin D 25 Hydroxy; Future  -     sildenafil (Viagra) 50 MG tablet; Take 1 tablet by mouth Daily As Needed for Erectile Dysfunction.  Dispense: 10 tablet; Refill: 3    7. Primary osteoarthritis of both knees  -     Hemoglobin A1c; Future  -     Comprehensive Metabolic Panel; Future  -     CBC & Differential; Future  -     PSA Screen; Future  -     Lipid Panel; Future  -     Vitamin D 25 Hydroxy; Future  -     sildenafil (Viagra) 50 MG tablet; Take 1 tablet by mouth Daily As Needed for Erectile Dysfunction.  Dispense: 10 tablet; Refill: 3    8. Screening PSA (prostate specific antigen)  -     Hemoglobin A1c; Future  -     Comprehensive Metabolic Panel; Future  -     CBC & Differential; Future  -     PSA Screen; Future  -     Lipid Panel; Future  -     Vitamin D 25 Hydroxy; Future  -     sildenafil (Viagra) 50 MG tablet; Take 1 tablet by mouth Daily As Needed for Erectile Dysfunction.  Dispense: 10 tablet; Refill: 3    9. IFG (impaired fasting glucose)  -     Hemoglobin A1c; Future  -     Comprehensive Metabolic Panel; Future  -     CBC & Differential; Future  -     PSA Screen; Future  -     Lipid Panel; Future  -     Vitamin D 25 Hydroxy; Future  -     sildenafil (Viagra) 50 MG tablet; Take 1 tablet by mouth Daily As Needed for Erectile Dysfunction.  Dispense: 10 tablet; Refill: 3        HTN , CONT IRBESARTAN 300 MG QD, CHLORTHALIDONE 25 MG QD , AMLODIPINE 5 MG QHS     Impaired fasting glucose,    Elevated liver enzymes ---currently normal February 3, 2022    Mixed hyperlipidemia continues on Crestor 20 mg daily,    History of remote DVT left leg April 2010    Prior colonoscopy 2018 Dr. Erwin 2 polyps, and May 2021 Dr. Braswell, 4 mm polyp ascending colon internal hemorrhoids and diverticuli otherwise unremarkable,    Previous laparoscopic appendectomy February 2018 for acute appendicitis    Previous left total knee arthroplasty 2010    History of anemia leukopenia clinically stable  resolved February 2022    Vitamin D deficiency---CONT OTC REPLACEMENT             Follow Up   No follow-ups on file.  Patient was given instructions and counseling regarding his condition or for health maintenance advice. Please see specific information pulled into the AVS if appropriate.

## 2022-03-16 ENCOUNTER — LAB (OUTPATIENT)
Dept: LAB | Facility: HOSPITAL | Age: 72
End: 2022-03-16

## 2022-03-16 DIAGNOSIS — N40.1 BPH ASSOCIATED WITH NOCTURIA: ICD-10-CM

## 2022-03-16 DIAGNOSIS — R35.1 BPH ASSOCIATED WITH NOCTURIA: ICD-10-CM

## 2022-03-16 LAB — PSA SERPL-MCNC: 2.84 NG/ML (ref 0–4)

## 2022-03-16 PROCEDURE — 36415 COLL VENOUS BLD VENIPUNCTURE: CPT

## 2022-03-16 PROCEDURE — 84153 ASSAY OF PSA TOTAL: CPT

## 2022-03-17 ENCOUNTER — TELEPHONE (OUTPATIENT)
Dept: UROLOGY | Facility: CLINIC | Age: 72
End: 2022-03-17

## 2022-03-17 NOTE — TELEPHONE ENCOUNTER
----- Message from GLORIA Barrow sent at 3/17/2022  8:13 AM EDT -----  I told him at his last visit to f/u with me in 6 months. Can you check and get him scheduled for a 6 month f/u

## 2022-03-17 NOTE — TELEPHONE ENCOUNTER
CALLED PT AND PT SAID THAT HE HAD HIS PSA DONE YESTERDAY AND SHE CAN SEE THEM AND IF SHE FEELS HE NEEDS APPT THEN TO CALL HIM

## 2022-04-13 ENCOUNTER — OFFICE VISIT (OUTPATIENT)
Dept: UROLOGY | Facility: CLINIC | Age: 72
End: 2022-04-13

## 2022-04-13 VITALS — BODY MASS INDEX: 31.69 KG/M2 | HEIGHT: 72 IN | RESPIRATION RATE: 18 BRPM | HEART RATE: 77 BPM | WEIGHT: 234 LBS

## 2022-04-13 DIAGNOSIS — N40.0 BENIGN PROSTATIC HYPERPLASIA, UNSPECIFIED WHETHER LOWER URINARY TRACT SYMPTOMS PRESENT: Primary | ICD-10-CM

## 2022-04-13 PROCEDURE — 99212 OFFICE O/P EST SF 10 MIN: CPT | Performed by: NURSE PRACTITIONER

## 2022-04-14 NOTE — PROGRESS NOTES
Chief Complaint: Follow-up and Elevated PSA    Subjective      Follow-up        History of Present Illness  Herb Dimas is a 71 y.o. male presents to [facility] for a follow-up visit after repeating psa.     Patient denies any urinary frequency, urgency or dysuria.    Reports occasional nocturia.    Denies any gross hematuria.    Admits to a good urinary stream.  Denies any urinary spitting.    Denies any UTIs over the last year.    Denies any penis or scrotum pain.  Denies any ED issues.    Admits to family history of prostate cancer with his brother.    Previous PSA's:  3/22: 2.84  10/21: 2.65  3/21: 2.25  3/20: 2.02  1/19; 1.77    Objective     Past Medical History:   Diagnosis Date   • Arthritis    • High blood pressure    • High cholesterol        Past Surgical History:   Procedure Laterality Date   • APPENDECTOMY     • COLONOSCOPY  2021   • OTHER SURGICAL HISTORY      metal implants   • REPLACEMENT TOTAL KNEE         Outpatient Medications Marked as Taking for the 4/13/22 encounter (Office Visit) with Estrella Kirkaptrick, APRALEKSEY   Medication Sig Dispense Refill   • amLODIPine (NORVASC) 5 MG tablet TAKE 1 TABLET AT BEDTIME 90 tablet 3   • aspirin 81 MG chewable tablet Daily.     • chlorthalidone (HYGROTON) 25 MG tablet TAKE 1 TABLET EVERY DAY     • irbesartan (AVAPRO) 300 MG tablet TAKE 1 TABLET ONE TIME DAILY  FOR  90  DAYS     • rosuvastatin (CRESTOR) 20 MG tablet 1 tablet     • sildenafil (Viagra) 50 MG tablet Take 1 tablet by mouth Daily As Needed for Erectile Dysfunction. 10 tablet 3   • valACYclovir (VALTREX) 1000 MG tablet          No Known Allergies     Family History   Problem Relation Age of Onset   • Diabetes Father    • Breast cancer Sister         60s       Social History     Socioeconomic History   • Marital status:    Tobacco Use   • Smoking status: Former Smoker   • Smokeless tobacco: Never Used   • Tobacco comment: started smoking at age 2; quit smoking at age 25  "smiked 20 per day   Vaping Use   • Vaping Use: Never used   Substance and Sexual Activity   • Alcohol use: Yes     Comment: current some day drinks weekly beer   • Drug use: Never   • Sexual activity: Defer       Review of Systems   Constitutional: Negative for chills and fever.   Genitourinary: Positive for nocturia. Negative for decreased libido, decreased urine volume, difficulty urinating, discharge, dysuria, flank pain, frequency, genital sores, hematuria, penile pain, erectile dysfunction, penile swelling, scrotal swelling, testicular pain, urgency and urinary incontinence.        Vital Signs:   Pulse 77   Resp 18   Ht 182.9 cm (72\")   Wt 106 kg (234 lb)   BMI 31.74 kg/m²      Physical Exam  Constitutional:       Appearance: Normal appearance.   HENT:      Head: Normocephalic.   Cardiovascular:      Rate and Rhythm: Normal rate.   Pulmonary:      Effort: Pulmonary effort is normal.   Abdominal:      General: Abdomen is flat.      Palpations: Abdomen is soft.   Skin:     General: Skin is warm and dry.   Neurological:      General: No focal deficit present.      Mental Status: He is alert and oriented to person, place, and time.   Psychiatric:         Mood and Affect: Mood normal.         Thought Content: Thought content normal.          Result Review :          [x]  Laboratory  []  Radiology  []  Pathology  []  Microbiology  []  EKG/Telemetry   []  Cardiology/Vascular   []  Old records  Have reviewed all previous PSAs     Assessment and Plan    Diagnoses and all orders for this visit:    1. Benign prostatic hyperplasia, unspecified whether lower urinary tract symptoms present (Primary)        Follow Up   Return for Follow-up in 1 year for annual exam .     BPH with Luts- behavioral modifications including fluid management, limiting fluids prior to sleep, and voiding immediately prior to sleep.     Patient symptoms currently not bothersome and well are managed without medication.  Discussed with patient at " length the primary indication for initiating medical therapy for BPH LUTS is dependent on patient bother and motivation to treat.  Will continue to monitor symptoms and emptying.    Patient was given instructions and counseling regarding his condition or for health maintenance advice. Please see specific information pulled into the AVS if appropriate.

## 2022-05-09 RX ORDER — IRBESARTAN 300 MG/1
TABLET ORAL
Qty: 90 TABLET | Refills: 3 | Status: SHIPPED | OUTPATIENT
Start: 2022-05-09

## 2022-05-09 RX ORDER — CHLORTHALIDONE 25 MG/1
TABLET ORAL
Qty: 90 TABLET | Refills: 3 | Status: SHIPPED | OUTPATIENT
Start: 2022-05-09

## 2022-05-09 RX ORDER — ROSUVASTATIN CALCIUM 20 MG/1
TABLET, COATED ORAL
Qty: 90 TABLET | Refills: 3 | Status: SHIPPED | OUTPATIENT
Start: 2022-05-09

## 2022-08-02 ENCOUNTER — OFFICE VISIT (OUTPATIENT)
Dept: INTERNAL MEDICINE | Facility: CLINIC | Age: 72
End: 2022-08-02

## 2022-08-02 VITALS
HEIGHT: 72 IN | WEIGHT: 237.8 LBS | TEMPERATURE: 97.5 F | OXYGEN SATURATION: 94 % | BODY MASS INDEX: 32.21 KG/M2 | SYSTOLIC BLOOD PRESSURE: 136 MMHG | DIASTOLIC BLOOD PRESSURE: 73 MMHG | HEART RATE: 57 BPM

## 2022-08-02 DIAGNOSIS — M79.661 RIGHT CALF PAIN: ICD-10-CM

## 2022-08-02 DIAGNOSIS — I82.462 DEEP VEIN THROMBOSIS (DVT) OF CALF MUSCLE VEIN OF LEFT LOWER EXTREMITY, UNSPECIFIED CHRONICITY: Primary | ICD-10-CM

## 2022-08-02 PROCEDURE — 99213 OFFICE O/P EST LOW 20 MIN: CPT | Performed by: INTERNAL MEDICINE

## 2022-08-02 NOTE — PROGRESS NOTES
"Answers for HPI/ROS submitted by the patient on 8/2/2022  What is the primary reason for your visit?: Other  Please describe your symptoms.: Calf pain  Have you had these symptoms before?: No  How long have you been having these symptoms?: 1-4 days  Please list any medications you are currently taking for this condition.: Ibuprofen  Please describe any probable cause for these symptoms. : ?    Chief Complaint/ HPI: calf pain , worried , no trauma, --h/o dvt   Flying this week          Objective   Vital Signs  Vitals:    08/02/22 1403 08/02/22 1407   BP: 164/71 136/73   Pulse: 57    Temp: 97.5 °F (36.4 °C)    SpO2: 94%    Weight: 108 kg (237 lb 12.8 oz)    Height: 182.9 cm (72.01\")       Body mass index is 32.24 kg/m².  Review of Systems as above  Physical Exam  Constitutional:       General: He is not in acute distress.     Appearance: Normal appearance.   HENT:      Head: Normocephalic.      Mouth/Throat:      Mouth: Mucous membranes are moist.   Eyes:      Conjunctiva/sclera: Conjunctivae normal.      Pupils: Pupils are equal, round, and reactive to light.   Cardiovascular:      Rate and Rhythm: Normal rate and regular rhythm.      Pulses: Normal pulses.      Heart sounds: Normal heart sounds.   Pulmonary:      Effort: Pulmonary effort is normal.      Breath sounds: Normal breath sounds.   Abdominal:      General: Abdomen is flat. Bowel sounds are normal.      Palpations: Abdomen is soft.   Musculoskeletal:         General: No swelling. Normal range of motion.      Cervical back: Neck supple.   Skin:     General: Skin is warm and dry.      Coloration: Skin is not jaundiced.   Neurological:      General: No focal deficit present.      Mental Status: He is alert and oriented to person, place, and time. Mental status is at baseline.   Psychiatric:         Mood and Affect: Mood normal.         Behavior: Behavior normal.         Thought Content: Thought content normal.         Judgment: Judgment normal.        Result " Review :   No results found for: PROBNP, BNP  CMP    CMP 2/3/22   Glucose 107 (A)   BUN 19   Creatinine 1.07   eGFR Non African Am 68   Sodium 137   Potassium 4.2   Chloride 104   Calcium 10.1   Albumin 4.70   Total Bilirubin 0.5   Alkaline Phosphatase 90   AST (SGOT) 23   ALT (SGPT) 23   (A) Abnormal value            CBC w/diff    CBC w/Diff 2/3/22   WBC 5.74   RBC 4.43   Hemoglobin 13.9   Hematocrit 39.9   MCV 90.1   MCH 31.4   MCHC 34.8   RDW 13.2   Platelets 168   Neutrophil Rel % 65.6   Immature Granulocyte Rel % 0.3   Lymphocyte Rel % 19.5 (A)   Monocyte Rel % 10.3   Eosinophil Rel % 3.8   Basophil Rel % 0.5   (A) Abnormal value             Lipid Panel    Lipid Panel 2/3/22   Total Cholesterol 206 (A)   Triglycerides 248 (A)   HDL Cholesterol 50   VLDL Cholesterol 43 (A)   LDL Cholesterol  113 (A)   LDL/HDL Ratio 2.13   (A) Abnormal value             Lab Results   Component Value Date    TSH 2.420 01/07/2019      No results found for: FREET4      PSA    PSA 10/4/21 3/16/22   PSA 2.650 2.840                          Visit Diagnoses:    ICD-10-CM ICD-9-CM   1. Deep vein thrombosis (DVT) of calf muscle vein of left lower extremity, unspecified chronicity (HCC)  I82.462 453.42   2. Right calf pain  M79.661 729.5       Assessment and Plan   Diagnoses and all orders for this visit:    1. Deep vein thrombosis (DVT) of calf muscle vein of left lower extremity, unspecified chronicity (HCC) (Primary)  -     Duplex Venous Lower Extremity - Right CAR; Future    2. Right calf pain  -     Duplex Venous Lower Extremity - Right CAR; Future         Patient with right calf pain, noticed while walking the other day, started going away with conservative management has come back again patient is concerned with his current history remote DVT in the past, will be flying soon, discussed treatment options work-up, August 2, 2022 ------history of remote DVT left leg April 2010----    HTN , CONT IRBESARTAN 300 MG QD, CHLORTHALIDONE 25  MG QD , AMLODIPINE 5 MG QHS     Impaired fasting glucose,    Elevated liver enzymes ---currently normal February 3, 2022    Mixed hyperlipidemia continues on Crestor 20 mg daily,    Prior colonoscopy 2018 Dr. Erwin 2 polyps, and May 2021 Dr. Braswell, 4 mm polyp ascending colon internal hemorrhoids and diverticuli otherwise unremarkable,    Previous laparoscopic appendectomy February 2018 for acute appendicitis    Previous left total knee arthroplasty 2010    Vitamin D deficiency---CONT OTC REPLACEMENT                   Follow Up   No follow-ups on file.  Patient was given instructions and counseling regarding his condition or for health maintenance advice. Please see specific information pulled into the AVS if appropriate.

## 2022-08-03 ENCOUNTER — HOSPITAL ENCOUNTER (OUTPATIENT)
Dept: CARDIOLOGY | Facility: HOSPITAL | Age: 72
Discharge: HOME | End: 2022-08-03
Admitting: INTERNAL MEDICINE

## 2022-08-03 ENCOUNTER — TELEPHONE (OUTPATIENT)
Dept: INTERNAL MEDICINE | Facility: CLINIC | Age: 72
End: 2022-08-03

## 2022-08-03 DIAGNOSIS — M79.661 RIGHT CALF PAIN: ICD-10-CM

## 2022-08-03 DIAGNOSIS — I82.462 DEEP VEIN THROMBOSIS (DVT) OF CALF MUSCLE VEIN OF LEFT LOWER EXTREMITY, UNSPECIFIED CHRONICITY: ICD-10-CM

## 2022-08-03 LAB
BH CV LOWER VASCULAR LEFT COMMON FEMORAL AUGMENT: NORMAL
BH CV LOWER VASCULAR LEFT COMMON FEMORAL COMPETENT: NORMAL
BH CV LOWER VASCULAR LEFT COMMON FEMORAL COMPRESS: NORMAL
BH CV LOWER VASCULAR LEFT COMMON FEMORAL PHASIC: NORMAL
BH CV LOWER VASCULAR LEFT COMMON FEMORAL SPONT: NORMAL
BH CV LOWER VASCULAR RIGHT COMMON FEMORAL AUGMENT: NORMAL
BH CV LOWER VASCULAR RIGHT COMMON FEMORAL COMPETENT: NORMAL
BH CV LOWER VASCULAR RIGHT COMMON FEMORAL COMPRESS: NORMAL
BH CV LOWER VASCULAR RIGHT COMMON FEMORAL PHASIC: NORMAL
BH CV LOWER VASCULAR RIGHT COMMON FEMORAL SPONT: NORMAL
BH CV LOWER VASCULAR RIGHT DISTAL FEMORAL COMPRESS: NORMAL
BH CV LOWER VASCULAR RIGHT GASTRONEMIUS COMPRESS: NORMAL
BH CV LOWER VASCULAR RIGHT GREATER SAPH AK COMPRESS: NORMAL
BH CV LOWER VASCULAR RIGHT GREATER SAPH BK COMPRESS: NORMAL
BH CV LOWER VASCULAR RIGHT LESSER SAPH COMPRESS: NORMAL
BH CV LOWER VASCULAR RIGHT MID FEMORAL AUGMENT: NORMAL
BH CV LOWER VASCULAR RIGHT MID FEMORAL COMPETENT: NORMAL
BH CV LOWER VASCULAR RIGHT MID FEMORAL COMPRESS: NORMAL
BH CV LOWER VASCULAR RIGHT MID FEMORAL PHASIC: NORMAL
BH CV LOWER VASCULAR RIGHT MID FEMORAL SPONT: NORMAL
BH CV LOWER VASCULAR RIGHT PERONEAL COMPRESS: NORMAL
BH CV LOWER VASCULAR RIGHT POPLITEAL AUGMENT: NORMAL
BH CV LOWER VASCULAR RIGHT POPLITEAL COMPETENT: NORMAL
BH CV LOWER VASCULAR RIGHT POPLITEAL COMPRESS: NORMAL
BH CV LOWER VASCULAR RIGHT POPLITEAL PHASIC: NORMAL
BH CV LOWER VASCULAR RIGHT POPLITEAL SPONT: NORMAL
BH CV LOWER VASCULAR RIGHT POSTERIOR TIBIAL COMPRESS: NORMAL
BH CV LOWER VASCULAR RIGHT PROXIMAL FEMORAL COMPRESS: NORMAL
BH CV LOWER VASCULAR RIGHT SAPHENOFEMORAL JUNCTION COMPRESS: NORMAL
MAXIMAL PREDICTED HEART RATE: 150 BPM
STRESS TARGET HR: 128 BPM

## 2022-08-03 PROCEDURE — 93971 EXTREMITY STUDY: CPT | Performed by: SURGERY

## 2022-08-03 PROCEDURE — 93971 EXTREMITY STUDY: CPT

## 2022-08-03 NOTE — TELEPHONE ENCOUNTER
I called patient with venous evaluation no evidence of DVT in the right leg some valvular incompetence was noted, encouraged him to wear stockings on the plane and an aspirin daily which she is doing currently

## 2022-12-05 ENCOUNTER — OFFICE VISIT (OUTPATIENT)
Dept: ORTHOPEDIC SURGERY | Facility: CLINIC | Age: 72
End: 2022-12-05

## 2022-12-05 ENCOUNTER — PREP FOR SURGERY (OUTPATIENT)
Dept: OTHER | Facility: HOSPITAL | Age: 72
End: 2022-12-05

## 2022-12-05 VITALS — HEART RATE: 74 BPM | WEIGHT: 230 LBS | OXYGEN SATURATION: 98 % | BODY MASS INDEX: 31.15 KG/M2 | HEIGHT: 72 IN

## 2022-12-05 DIAGNOSIS — M25.561 RIGHT KNEE PAIN, UNSPECIFIED CHRONICITY: Primary | ICD-10-CM

## 2022-12-05 DIAGNOSIS — M17.11 OSTEOARTHRITIS OF RIGHT KNEE, UNSPECIFIED OSTEOARTHRITIS TYPE: Primary | ICD-10-CM

## 2022-12-05 DIAGNOSIS — M17.11 OSTEOARTHRITIS OF RIGHT KNEE, UNSPECIFIED OSTEOARTHRITIS TYPE: ICD-10-CM

## 2022-12-05 PROCEDURE — 99204 OFFICE O/P NEW MOD 45 MIN: CPT | Performed by: ORTHOPAEDIC SURGERY

## 2022-12-05 RX ORDER — POVIDONE-IODINE 10 MG/ML
SOLUTION TOPICAL ONCE
Status: CANCELLED | OUTPATIENT
Start: 2022-12-05 | End: 2022-12-05

## 2022-12-05 RX ORDER — CEFAZOLIN SODIUM IN 0.9 % NACL 3 G/100 ML
3 INTRAVENOUS SOLUTION, PIGGYBACK (ML) INTRAVENOUS ONCE
Status: CANCELLED | OUTPATIENT
Start: 2022-12-05 | End: 2022-12-05

## 2022-12-05 RX ORDER — TRANEXAMIC ACID 10 MG/ML
1000 INJECTION, SOLUTION INTRAVENOUS ONCE
Status: CANCELLED | OUTPATIENT
Start: 2022-12-05 | End: 2022-12-05

## 2022-12-05 RX ORDER — AMLODIPINE BESYLATE 5 MG/1
5 TABLET ORAL
Qty: 90 TABLET | Refills: 1 | OUTPATIENT
Start: 2022-12-05

## 2022-12-05 RX ORDER — CEFAZOLIN SODIUM 2 G/100ML
2 INJECTION, SOLUTION INTRAVENOUS ONCE
Status: CANCELLED | OUTPATIENT
Start: 2022-12-05 | End: 2022-12-05

## 2022-12-05 RX ORDER — AMLODIPINE BESYLATE 5 MG/1
TABLET ORAL
Qty: 90 TABLET | Refills: 1 | Status: SHIPPED | OUTPATIENT
Start: 2022-12-05

## 2022-12-05 NOTE — TELEPHONE ENCOUNTER
Caller: Herb Dimas    Relationship: Self    Best call back number: [phone]    Requested Prescriptions:   Requested Prescriptions     Pending Prescriptions Disp Refills   • amLODIPine (NORVASC) 5 MG tablet 90 tablet 1     Sig: Take 1 tablet by mouth every night at bedtime.        Pharmacy where request should be sent: [facility] - [facility] [address] - [phone]  - [phone]     Does the patient have less than a 3 day supply:  [] Yes  [x] No    Would you like a call back once the refill request has been completed: [x] Yes [] No    If the office needs to give you a call back, can they leave a voicemail: [x] Yes [] No    Elvis Colon Rep   12/05/22 14:02 EST

## 2022-12-05 NOTE — PROGRESS NOTES
"Chief Complaint  Initial Evaluation of the Right Knee     Subjective      Herb Dimas presents to [facility] for initial evaluation of the right knee. In  he had surgery on the left knee and had blood clots and difficulty after that surgery.  He is here to discuss right knee surgery. He has had no new injury or falls.  He is having more difficulty with ambulation and prolonged standing .    No Known Allergies     Social History     Socioeconomic History   • Marital status:    Tobacco Use   • Smoking status: Former     Packs/day: 0.50     Years: 10.00     Pack years: 5.00     Types: Cigarettes     Quit date:      Years since quittin.9   • Smokeless tobacco: Never   Vaping Use   • Vaping Use: Never used   Substance and Sexual Activity   • Alcohol use: Yes     Comment: current some day drinks weekly beer   • Drug use: Never   • Sexual activity: Defer        Review of Systems     Objective   Vital Signs:   Pulse 74   Ht 182.9 cm (72\")   Wt 104 kg (230 lb)   SpO2 98%   BMI 31.19 kg/m²       Physical Exam  Constitutional:       Appearance: Normal appearance. Patient is well-developed and normal weight.   HENT:      Head: Normocephalic.      Right Ear: Hearing and external ear normal.      Left Ear: Hearing and external ear normal.      Nose: Nose normal.   Eyes:      Conjunctiva/sclera: Conjunctivae normal.   Cardiovascular:      Rate and Rhythm: Normal rate.   Pulmonary:      Effort: Pulmonary effort is normal.      Breath sounds: No wheezing or rales.   Abdominal:      Palpations: Abdomen is soft.      Tenderness: There is no abdominal tenderness.   Musculoskeletal:      Cervical back: Normal range of motion.   Skin:     Findings: No rash.   Neurological:      Mental Status: Patient  is alert and oriented to person, place, and time.   Psychiatric:         Mood and Affect: Mood and affect normal.         Judgment: Judgment normal.       Ortho Exam      RIGHT KNEE " ROM 0-105. Dorsal pedal pulse 2+. Posterior tibialis pulse is 2+. Good tone of hip flexors, hip extensors, and hip abductors. Slight swelling.  Calf supple. Sensation intact. Neurovascular Intact. Marked Varus deformity.     Procedures        Imaging Results (Most Recent)     Procedure Component Value Units Date/Time    XR Knee 3 View Right [[id number]] Resulted: 12/05/22 0946     Updated: 12/05/22 0952           Result Review :     X-Ray Report:  Right knee(s) X-Ray  Indication: Evaluation of the right knee.   AP/Lateral and [place] view(s)  Findings: Moderate osseous abnormality, no dislocation or fracture. Severe arthritis.   Prior studies available for comparison:No              Assessment and Plan     Diagnoses and all orders for this visit:    1. Right knee pain, unspecified chronicity (Primary)  -     XR Knee 3 View Right    2. Osteoarthritis of right knee, unspecified osteoarthritis type        Discussed the treatment plan with the patient. Discussed the treatment options with the patient, operative vs non-operative. The patient wants to proceed with right total knee arthroplasty and understands the risks and benefits. He did have a previous DVT with his left knee.      Discussed surgery., Risks/benefits discussed with patient including, but not limited to: infection, bleeding, neurovascular damage, malunion, nonunion, aesthetic deformity, need for further surgery, and death., Discussed with patient the implant type being used during surgery and patient understands and desires to proceed. and Surgery pamphlet given.    Follow Up     Postoperatively       Patient was given instructions and counseling regarding his condition or for health maintenance advice. Please see specific information pulled into the AVS if appropriate.     Scribed for Malcolm Ceja MD by Odalis Hurtado MA.  12/05/22   10:07 EST    I have personally performed the services described in this document as scribed by the above individual  and it is both accurate and complete. Malcolm Ceja MD 12/05/22

## 2023-01-25 DIAGNOSIS — Z96.651 AFTERCARE FOLLOWING RIGHT KNEE JOINT REPLACEMENT SURGERY: Primary | ICD-10-CM

## 2023-01-25 DIAGNOSIS — Z47.1 AFTERCARE FOLLOWING RIGHT KNEE JOINT REPLACEMENT SURGERY: Primary | ICD-10-CM

## 2023-01-25 RX ORDER — TRANEXAMIC ACID 10 MG/ML
2000 INJECTION, SOLUTION INTRAVENOUS ONCE
Status: CANCELLED | OUTPATIENT
Start: 2023-01-25 | End: 2023-01-25

## 2023-01-27 ENCOUNTER — LAB (OUTPATIENT)
Dept: LAB | Facility: HOSPITAL | Age: 73
End: 2023-01-27
Payer: MEDICARE

## 2023-01-27 DIAGNOSIS — R73.01 IFG (IMPAIRED FASTING GLUCOSE): ICD-10-CM

## 2023-01-27 DIAGNOSIS — I82.462 DEEP VEIN THROMBOSIS (DVT) OF CALF MUSCLE VEIN OF LEFT LOWER EXTREMITY, UNSPECIFIED CHRONICITY: ICD-10-CM

## 2023-01-27 DIAGNOSIS — E78.5 HYPERLIPIDEMIA, UNSPECIFIED HYPERLIPIDEMIA TYPE: ICD-10-CM

## 2023-01-27 DIAGNOSIS — M17.0 PRIMARY OSTEOARTHRITIS OF BOTH KNEES: ICD-10-CM

## 2023-01-27 DIAGNOSIS — R74.8 ELEVATED LIVER ENZYMES: ICD-10-CM

## 2023-01-27 DIAGNOSIS — I10 HYPERTENSION, ESSENTIAL: ICD-10-CM

## 2023-01-27 DIAGNOSIS — E78.2 MIXED HYPERLIPIDEMIA: ICD-10-CM

## 2023-01-27 DIAGNOSIS — Z12.5 SCREENING PSA (PROSTATE SPECIFIC ANTIGEN): ICD-10-CM

## 2023-01-27 DIAGNOSIS — E55.9 VITAMIN D DEFICIENCY: ICD-10-CM

## 2023-01-27 LAB
25(OH)D3 SERPL-MCNC: 27.6 NG/ML (ref 30–100)
ALBUMIN SERPL-MCNC: 4.9 G/DL (ref 3.5–5.2)
ALBUMIN/GLOB SERPL: 1.7 G/DL
ALP SERPL-CCNC: 94 U/L (ref 39–117)
ALT SERPL W P-5'-P-CCNC: 29 U/L (ref 1–41)
ANION GAP SERPL CALCULATED.3IONS-SCNC: 6.6 MMOL/L (ref 5–15)
AST SERPL-CCNC: 28 U/L (ref 1–40)
BASOPHILS # BLD AUTO: 0.04 10*3/MM3 (ref 0–0.2)
BASOPHILS NFR BLD AUTO: 0.8 % (ref 0–1.5)
BILIRUB SERPL-MCNC: 0.5 MG/DL (ref 0–1.2)
BUN SERPL-MCNC: 25 MG/DL (ref 8–23)
BUN/CREAT SERPL: 19.8 (ref 7–25)
CALCIUM SPEC-SCNC: 10.3 MG/DL (ref 8.6–10.5)
CHLORIDE SERPL-SCNC: 101 MMOL/L (ref 98–107)
CHOLEST SERPL-MCNC: 201 MG/DL (ref 0–200)
CO2 SERPL-SCNC: 28.4 MMOL/L (ref 22–29)
CREAT SERPL-MCNC: 1.26 MG/DL (ref 0.76–1.27)
DEPRECATED RDW RBC AUTO: 44.1 FL (ref 37–54)
EGFRCR SERPLBLD CKD-EPI 2021: 61 ML/MIN/1.73
EOSINOPHIL # BLD AUTO: 0.31 10*3/MM3 (ref 0–0.4)
EOSINOPHIL NFR BLD AUTO: 5.8 % (ref 0.3–6.2)
ERYTHROCYTE [DISTWIDTH] IN BLOOD BY AUTOMATED COUNT: 13 % (ref 12.3–15.4)
GLOBULIN UR ELPH-MCNC: 2.9 GM/DL
GLUCOSE SERPL-MCNC: 95 MG/DL (ref 65–99)
HBA1C MFR BLD: 5.9 % (ref 4.8–5.6)
HCT VFR BLD AUTO: 40 % (ref 37.5–51)
HDLC SERPL-MCNC: 51 MG/DL (ref 40–60)
HGB BLD-MCNC: 13.8 G/DL (ref 13–17.7)
IMM GRANULOCYTES # BLD AUTO: 0.01 10*3/MM3 (ref 0–0.05)
IMM GRANULOCYTES NFR BLD AUTO: 0.2 % (ref 0–0.5)
LDLC SERPL CALC-MCNC: 109 MG/DL (ref 0–100)
LDLC/HDLC SERPL: 2.02 {RATIO}
LYMPHOCYTES # BLD AUTO: 1.26 10*3/MM3 (ref 0.7–3.1)
LYMPHOCYTES NFR BLD AUTO: 23.6 % (ref 19.6–45.3)
MCH RBC QN AUTO: 32.1 PG (ref 26.6–33)
MCHC RBC AUTO-ENTMCNC: 34.5 G/DL (ref 31.5–35.7)
MCV RBC AUTO: 93 FL (ref 79–97)
MONOCYTES # BLD AUTO: 0.62 10*3/MM3 (ref 0.1–0.9)
MONOCYTES NFR BLD AUTO: 11.6 % (ref 5–12)
NEUTROPHILS NFR BLD AUTO: 3.09 10*3/MM3 (ref 1.7–7)
NEUTROPHILS NFR BLD AUTO: 58 % (ref 42.7–76)
NRBC BLD AUTO-RTO: 0 /100 WBC (ref 0–0.2)
PLATELET # BLD AUTO: 176 10*3/MM3 (ref 140–450)
PMV BLD AUTO: 9.8 FL (ref 6–12)
POTASSIUM SERPL-SCNC: 4.3 MMOL/L (ref 3.5–5.2)
PROT SERPL-MCNC: 7.8 G/DL (ref 6–8.5)
PSA SERPL-MCNC: 3.86 NG/ML (ref 0–4)
RBC # BLD AUTO: 4.3 10*6/MM3 (ref 4.14–5.8)
SODIUM SERPL-SCNC: 136 MMOL/L (ref 136–145)
TRIGL SERPL-MCNC: 236 MG/DL (ref 0–150)
VLDLC SERPL-MCNC: 41 MG/DL (ref 5–40)
WBC NRBC COR # BLD: 5.33 10*3/MM3 (ref 3.4–10.8)

## 2023-01-27 PROCEDURE — 83036 HEMOGLOBIN GLYCOSYLATED A1C: CPT

## 2023-01-27 PROCEDURE — 80061 LIPID PANEL: CPT

## 2023-01-27 PROCEDURE — 36415 COLL VENOUS BLD VENIPUNCTURE: CPT

## 2023-01-27 PROCEDURE — 85025 COMPLETE CBC W/AUTO DIFF WBC: CPT

## 2023-01-27 PROCEDURE — G0103 PSA SCREENING: HCPCS

## 2023-01-27 PROCEDURE — 82306 VITAMIN D 25 HYDROXY: CPT

## 2023-01-27 PROCEDURE — 80053 COMPREHEN METABOLIC PANEL: CPT

## 2023-01-31 DIAGNOSIS — Z96.651 AFTERCARE FOLLOWING RIGHT KNEE JOINT REPLACEMENT SURGERY: Primary | ICD-10-CM

## 2023-01-31 DIAGNOSIS — Z47.1 AFTERCARE FOLLOWING RIGHT KNEE JOINT REPLACEMENT SURGERY: Primary | ICD-10-CM

## 2023-02-08 ENCOUNTER — OFFICE VISIT (OUTPATIENT)
Dept: INTERNAL MEDICINE | Facility: CLINIC | Age: 73
End: 2023-02-08
Payer: MEDICARE

## 2023-02-08 VITALS
BODY MASS INDEX: 32.89 KG/M2 | DIASTOLIC BLOOD PRESSURE: 89 MMHG | OXYGEN SATURATION: 93 % | HEIGHT: 72 IN | WEIGHT: 242.8 LBS | TEMPERATURE: 98.2 F | SYSTOLIC BLOOD PRESSURE: 161 MMHG | HEART RATE: 90 BPM

## 2023-02-08 DIAGNOSIS — E78.2 MIXED HYPERLIPIDEMIA: ICD-10-CM

## 2023-02-08 DIAGNOSIS — E55.9 VITAMIN D DEFICIENCY: ICD-10-CM

## 2023-02-08 DIAGNOSIS — I10 HYPERTENSION, ESSENTIAL: ICD-10-CM

## 2023-02-08 DIAGNOSIS — R74.8 ELEVATED LIVER ENZYMES: ICD-10-CM

## 2023-02-08 DIAGNOSIS — R73.01 IFG (IMPAIRED FASTING GLUCOSE): ICD-10-CM

## 2023-02-08 DIAGNOSIS — I82.462 DEEP VEIN THROMBOSIS (DVT) OF CALF MUSCLE VEIN OF LEFT LOWER EXTREMITY, UNSPECIFIED CHRONICITY: ICD-10-CM

## 2023-02-08 DIAGNOSIS — M17.11 OSTEOARTHROSIS, LOCALIZED, PRIMARY, KNEE, RIGHT: Primary | ICD-10-CM

## 2023-02-08 DIAGNOSIS — R97.20 PSA ELEVATION: ICD-10-CM

## 2023-02-08 DIAGNOSIS — Z12.5 SCREENING PSA (PROSTATE SPECIFIC ANTIGEN): ICD-10-CM

## 2023-02-08 PROCEDURE — 99214 OFFICE O/P EST MOD 30 MIN: CPT | Performed by: INTERNAL MEDICINE

## 2023-02-08 NOTE — PROGRESS NOTES
"Answers for HPI/ROS submitted by the patient on 2/8/2023  What is the primary reason for your visit?: Physical    CHIEF COMPLAINT:  Annual Exam (yearly)      HPI: Patient is can have upcoming right total knee arthroplasty Dr. Valverde, February 21, 2023, here to follow-up with blood pressure,, current labs and cholesterol issues,        Objective   Vital Signs  Vitals:    02/08/23 1327   BP: 161/89   Pulse: 90   Temp: 98.2 °F (36.8 °C)   SpO2: 93%   Weight: 110 kg (242 lb 12.8 oz)   Height: 182.9 cm (72.01\")      Body mass index is 32.92 kg/m².  Review of Systems   Constitutional: Negative.    HENT: Negative.    Eyes: Negative.    Respiratory: Negative.    Cardiovascular: Negative.    Gastrointestinal: Negative.    Endocrine: Negative.    Genitourinary: Negative.    Musculoskeletal: Positive for gait problem and joint swelling.   Allergic/Immunologic: Negative.    Hematological: Negative.    Psychiatric/Behavioral: Negative.       Physical Exam  Constitutional:       General: He is not in acute distress.     Appearance: Normal appearance.   HENT:      Head: Normocephalic.      Mouth/Throat:      Mouth: Mucous membranes are moist.   Eyes:      Conjunctiva/sclera: Conjunctivae normal.      Pupils: Pupils are equal, round, and reactive to light.   Cardiovascular:      Rate and Rhythm: Normal rate and regular rhythm.      Pulses: Normal pulses.      Heart sounds: Normal heart sounds.   Pulmonary:      Effort: Pulmonary effort is normal.      Breath sounds: Normal breath sounds.   Abdominal:      General: Abdomen is flat. Bowel sounds are normal.      Palpations: Abdomen is soft.   Musculoskeletal:         General: Tenderness present. No swelling. Normal range of motion.      Cervical back: Neck supple.   Skin:     General: Skin is warm and dry.      Coloration: Skin is not jaundiced.   Neurological:      General: No focal deficit present.      Mental Status: He is alert and oriented to person, place, and time. Mental status " is at baseline.   Psychiatric:         Mood and Affect: Mood normal.         Behavior: Behavior normal.         Thought Content: Thought content normal.         Judgment: Judgment normal.     Left olecranon bursa enlargement soft mobile, nontender  Result Review :   No results found for: PROBNP, BNP  CMP    CMP 1/27/23   Glucose 95   BUN 25 (A)   Creatinine 1.26   eGFR 61.0   Sodium 136   Potassium 4.3   Chloride 101   Calcium 10.3   Total Protein 7.8   Albumin 4.9   Globulin 2.9   Total Bilirubin 0.5   Alkaline Phosphatase 94   AST (SGOT) 28   ALT (SGPT) 29   Albumin/Globulin Ratio 1.7   BUN/Creatinine Ratio 19.8   Anion Gap 6.6   (A) Abnormal value            CBC w/diff    CBC w/Diff 1/27/23   WBC 5.33   RBC 4.30   Hemoglobin 13.8   Hematocrit 40.0   MCV 93.0   MCH 32.1   MCHC 34.5   RDW 13.0   Platelets 176   Neutrophil Rel % 58.0   Immature Granulocyte Rel % 0.2   Lymphocyte Rel % 23.6   Monocyte Rel % 11.6   Eosinophil Rel % 5.8   Basophil Rel % 0.8            Lipid Panel    Lipid Panel 1/27/23   Total Cholesterol 201 (A)   Triglycerides 236 (A)   HDL Cholesterol 51   VLDL Cholesterol 41 (A)   LDL Cholesterol  109 (A)   LDL/HDL Ratio 2.02   (A) Abnormal value             Lab Results   Component Value Date    TSH 2.420 01/07/2019      No results found for: FREET4   A1C Last 3 Results    HGBA1C Last 3 Results 1/27/23   Hemoglobin A1C 5.90 (A)   (A) Abnormal value             PSA    PSA 3/16/22 1/27/23   PSA 2.840 3.860                          Visit Diagnoses:    ICD-10-CM ICD-9-CM   1. Osteoarthrosis, localized, primary, knee, right  M17.11 715.16   2. Screening PSA (prostate specific antigen)  Z12.5 V76.44   3. Mixed hyperlipidemia  E78.2 272.2   4. Vitamin D deficiency  E55.9 268.9   5. IFG (impaired fasting glucose)  R73.01 790.21   6. Elevated liver enzymes  R74.8 790.5   7. Deep vein thrombosis (DVT) of calf muscle vein of left lower extremity, unspecified chronicity (HCC)  I82.462 453.42   8. Hypertension,  essential  I10 401.9   9. PSA elevation  R97.20 790.93       Assessment and Plan   Diagnoses and all orders for this visit:    1. Osteoarthrosis, localized, primary, knee, right (Primary)  -     Hemoglobin A1c; Future  -     Comprehensive Metabolic Panel; Future  -     PSA Screen; Future  -     CBC & Differential; Future  -     Ambulatory Referral to Urology    2. Screening PSA (prostate specific antigen)  -     Hemoglobin A1c; Future  -     Comprehensive Metabolic Panel; Future  -     PSA Screen; Future  -     CBC & Differential; Future  -     Ambulatory Referral to Urology    3. Mixed hyperlipidemia  -     Hemoglobin A1c; Future  -     Comprehensive Metabolic Panel; Future  -     PSA Screen; Future  -     CBC & Differential; Future  -     Ambulatory Referral to Urology    4. Vitamin D deficiency  -     Hemoglobin A1c; Future  -     Comprehensive Metabolic Panel; Future  -     PSA Screen; Future  -     CBC & Differential; Future  -     Ambulatory Referral to Urology    5. IFG (impaired fasting glucose)  -     Hemoglobin A1c; Future  -     Comprehensive Metabolic Panel; Future  -     PSA Screen; Future  -     CBC & Differential; Future  -     Ambulatory Referral to Urology    6. Elevated liver enzymes  -     Hemoglobin A1c; Future  -     Comprehensive Metabolic Panel; Future  -     PSA Screen; Future  -     CBC & Differential; Future  -     Ambulatory Referral to Urology    7. Deep vein thrombosis (DVT) of calf muscle vein of left lower extremity, unspecified chronicity (HCC)  -     Hemoglobin A1c; Future  -     Comprehensive Metabolic Panel; Future  -     PSA Screen; Future  -     CBC & Differential; Future  -     Ambulatory Referral to Urology    8. Hypertension, essential  -     Hemoglobin A1c; Future  -     Comprehensive Metabolic Panel; Future  -     PSA Screen; Future  -     CBC & Differential; Future  -     Ambulatory Referral to Urology    9. PSA elevation  -     Hemoglobin A1c; Future  -     Comprehensive  Metabolic Panel; Future  -     PSA Screen; Future  -     CBC & Differential; Future  -     Ambulatory Referral to Urology         Osteoarthritis of the knees, for upcoming right total knee arthroplasty February 21, 2023 Dr. Valverde    Right calf pain, August 2022, venous evaluation negative for DVT,    dvt remote 2010, leg left after left tka ,     Elevated PSA 3.8, from 2.8 previously---, would recommend urology appointment again, discussed February 2023, numbers have steadily increased over the past 2 years,    HTN , CONT IRBESARTAN 300 MG QD, CHLORTHALIDONE 25 MG QD , AMLODIPINE 5 MG QHS     Impaired fasting glucose,, hemoglobin A1c is 5.9    Mixed hyperlipidemia continues on Crestor 20 mg daily,    Prior colonoscopy 2018 Dr. Erwni 2 polyps, and May 2021 Dr. Braswell, 4 mm polyp ascending colon internal hemorrhoids and diverticuli otherwise unremarkable,    Previous laparoscopic appendectomy February 2018 for acute appendicitis    Previous left total knee arthroplasty 2010    Vitamin D deficiency---CONT OTC REPLACEMENT     Follow Up   Return in about 6 months (around 8/8/2023).  Patient was given instructions and counseling regarding his condition or for health maintenance advice. Please see specific information pulled into the AVS if appropriate.

## 2023-02-13 ENCOUNTER — PRE-ADMISSION TESTING (OUTPATIENT)
Dept: PREADMISSION TESTING | Facility: HOSPITAL | Age: 73
End: 2023-02-13
Payer: MEDICARE

## 2023-02-13 VITALS
HEIGHT: 72 IN | SYSTOLIC BLOOD PRESSURE: 158 MMHG | HEART RATE: 57 BPM | RESPIRATION RATE: 18 BRPM | OXYGEN SATURATION: 94 % | BODY MASS INDEX: 33 KG/M2 | WEIGHT: 243.61 LBS | DIASTOLIC BLOOD PRESSURE: 78 MMHG | TEMPERATURE: 98.8 F

## 2023-02-13 DIAGNOSIS — M17.11 OSTEOARTHRITIS OF RIGHT KNEE, UNSPECIFIED OSTEOARTHRITIS TYPE: ICD-10-CM

## 2023-02-13 LAB
INR PPP: 0.91 (ref 0.86–1.15)
PROTHROMBIN TIME: 12.4 SECONDS (ref 11.8–14.9)

## 2023-02-13 PROCEDURE — 85610 PROTHROMBIN TIME: CPT

## 2023-02-13 PROCEDURE — 36415 COLL VENOUS BLD VENIPUNCTURE: CPT

## 2023-02-13 PROCEDURE — 93010 ELECTROCARDIOGRAM REPORT: CPT | Performed by: INTERNAL MEDICINE

## 2023-02-13 PROCEDURE — 93005 ELECTROCARDIOGRAM TRACING: CPT

## 2023-02-13 RX ORDER — MULTIPLE VITAMINS W/ MINERALS TAB 9MG-400MCG
1 TAB ORAL NIGHTLY
COMMUNITY

## 2023-02-13 RX ORDER — MELATONIN
1000 NIGHTLY
COMMUNITY

## 2023-02-13 RX ORDER — MULTIVIT WITH MINERALS/LUTEIN
500 TABLET ORAL NIGHTLY
COMMUNITY

## 2023-02-13 NOTE — DISCHARGE INSTRUCTIONS
IMPORTANT INSTRUCTIONS - PRE-ADMISSION TESTING  DO NOT EAT OR CHEW anything after midnight the night before your procedure.    You may have CLEAR liquids up to 3 hours prior to ARRIVAL time.   Take the following medications the morning of your procedure with JUST A SIP OF WATER:  NONE  DO NOT BRING your medications to the hospital with you, UNLESS something has changed since your PRE-Admission Testing appointment.  Hold all vitamins, supplements, and NSAIDS (Non- steroidal anti-inflammatory meds) for one week prior to surgery (you MAY take Tylenol or Acetaminophen).  Make sure you have a ride home and have someone who will stay with you the day of your procedure after you go home.  If you have any questions, please call your Pre-Admission Testing Nurse, Suyapa at [phone].      You will receive a call the day before surgery with an arrival time.\        Main Entrance, Elevator A to the 3rd floor        After surgery you will be on 2 North    Clear Liquid Diet        Find out when you need to start a clear liquid diet.   Think of “clear liquids” as anything you could read a newspaper through. This includes things like water, broth, sports drinks, or tea WITHOUT any kind of milk or cream.           Once you are told to start a clear liquid diet, only drink these things until 3 hours before arrival to the hospital or when the hospital says to stop. Total volume limitation: 8 oz.       Clear liquids you CAN drink:   Water   Clear broth: beef, chicken, vegetable, or bone broth with nothing in it   Gatorade   Lemonade or Tyler-aid   Soda   Tea, coffee (NO cream or honey)   Jell-O (without fruit)   Popsicles (without fruit or cream)   Italian ices   Juice without pulp: apple, white, grape   You may use salt, pepper, and sugar    Do NOT drink:   Milk or cream   Soy milk, almond milk, coconut milk, or other non-dairy drinks and   creamers   Milkshakes or smoothies   Tomato juice   Orange juice   Grapefruit juice    Cream soups or any other than broth         Clear Liquid Diet:  Do NOT eat any solid food.  Do NOT eat or suck on mints or candy.  Do NOT chew gum.  Do NOT drink thick liquids like milk or juice with pulp in it.  Do NOT add milk, cream, or anything like soy milk or almond milk to coffee or tea.

## 2023-02-17 ENCOUNTER — TELEPHONE (OUTPATIENT)
Dept: ORTHOPEDIC SURGERY | Facility: CLINIC | Age: 73
End: 2023-02-17
Payer: MEDICARE

## 2023-02-19 LAB — QT INTERVAL: 430 MS

## 2023-02-21 ENCOUNTER — APPOINTMENT (OUTPATIENT)
Dept: GENERAL RADIOLOGY | Facility: HOSPITAL | Age: 73
End: 2023-02-21
Payer: MEDICARE

## 2023-02-21 ENCOUNTER — ANESTHESIA EVENT (OUTPATIENT)
Dept: PERIOP | Facility: HOSPITAL | Age: 73
End: 2023-02-21
Payer: MEDICARE

## 2023-02-21 ENCOUNTER — ANESTHESIA (OUTPATIENT)
Dept: PERIOP | Facility: HOSPITAL | Age: 73
End: 2023-02-21
Payer: MEDICARE

## 2023-02-21 ENCOUNTER — HOSPITAL ENCOUNTER (OUTPATIENT)
Facility: HOSPITAL | Age: 73
Discharge: HOME | End: 2023-02-23
Attending: ORTHOPAEDIC SURGERY | Admitting: ORTHOPAEDIC SURGERY
Payer: MEDICARE

## 2023-02-21 DIAGNOSIS — R26.2 DIFFICULTY IN WALKING: ICD-10-CM

## 2023-02-21 DIAGNOSIS — Z78.9 DECREASED ACTIVITIES OF DAILY LIVING (ADL): ICD-10-CM

## 2023-02-21 DIAGNOSIS — M17.11 OSTEOARTHRITIS OF RIGHT KNEE, UNSPECIFIED OSTEOARTHRITIS TYPE: ICD-10-CM

## 2023-02-21 DIAGNOSIS — Z96.651 STATUS POST TOTAL KNEE REPLACEMENT, RIGHT: Primary | ICD-10-CM

## 2023-02-21 PROCEDURE — 25010000002 CEFAZOLIN IN DEXTROSE 2-4 GM/100ML-% SOLUTION: Performed by: ORTHOPAEDIC SURGERY

## 2023-02-21 PROCEDURE — 63710000001 CELECOXIB 100 MG CAPSULE: Performed by: ANESTHESIOLOGY

## 2023-02-21 PROCEDURE — A9270 NON-COVERED ITEM OR SERVICE: HCPCS | Performed by: ANESTHESIOLOGY

## 2023-02-21 PROCEDURE — L1830 KO IMMOB CANVAS LONG PRE OTS: HCPCS | Performed by: ORTHOPAEDIC SURGERY

## 2023-02-21 PROCEDURE — C1713 ANCHOR/SCREW BN/BN,TIS/BN: HCPCS | Performed by: ORTHOPAEDIC SURGERY

## 2023-02-21 PROCEDURE — C1776 JOINT DEVICE (IMPLANTABLE): HCPCS | Performed by: ORTHOPAEDIC SURGERY

## 2023-02-21 PROCEDURE — 25010000002 FENTANYL CITRATE (PF) 50 MCG/ML SOLUTION: Performed by: NURSE ANESTHETIST, CERTIFIED REGISTERED

## 2023-02-21 PROCEDURE — 94799 UNLISTED PULMONARY SVC/PX: CPT

## 2023-02-21 PROCEDURE — 25010000002 DEXAMETHASONE PER 1 MG: Performed by: NURSE ANESTHETIST, CERTIFIED REGISTERED

## 2023-02-21 PROCEDURE — 25010000002 MORPHINE PER 10 MG: Performed by: ORTHOPAEDIC SURGERY

## 2023-02-21 PROCEDURE — 99202 OFFICE O/P NEW SF 15 MIN: CPT | Performed by: FAMILY MEDICINE

## 2023-02-21 PROCEDURE — 97161 PT EVAL LOW COMPLEX 20 MIN: CPT

## 2023-02-21 PROCEDURE — S0260 H&P FOR SURGERY: HCPCS | Performed by: ORTHOPAEDIC SURGERY

## 2023-02-21 PROCEDURE — 25010000002 ROPIVACAINE PER 1 MG: Performed by: ANESTHESIOLOGY

## 2023-02-21 PROCEDURE — 73560 X-RAY EXAM OF KNEE 1 OR 2: CPT

## 2023-02-21 PROCEDURE — 63710000001 ACETAMINOPHEN 500 MG TABLET: Performed by: ANESTHESIOLOGY

## 2023-02-21 PROCEDURE — 25010000002 KETOROLAC TROMETHAMINE PER 15 MG: Performed by: ORTHOPAEDIC SURGERY

## 2023-02-21 PROCEDURE — 63710000001 ACETAMINOPHEN 500 MG TABLET: Performed by: ORTHOPAEDIC SURGERY

## 2023-02-21 PROCEDURE — 25010000002 PROPOFOL 10 MG/ML EMULSION: Performed by: NURSE ANESTHETIST, CERTIFIED REGISTERED

## 2023-02-21 PROCEDURE — 25010000002 KETOROLAC TROMETHAMINE PER 15 MG: Performed by: NURSE ANESTHETIST, CERTIFIED REGISTERED

## 2023-02-21 PROCEDURE — 25010000002 ONDANSETRON PER 1 MG: Performed by: NURSE ANESTHETIST, CERTIFIED REGISTERED

## 2023-02-21 PROCEDURE — 0 HYDROMORPHONE 1 MG/ML SOLUTION: Performed by: NURSE ANESTHETIST, CERTIFIED REGISTERED

## 2023-02-21 PROCEDURE — 27447 TOTAL KNEE ARTHROPLASTY: CPT | Performed by: ORTHOPAEDIC SURGERY

## 2023-02-21 PROCEDURE — 25010000002 MIDAZOLAM PER 1 MG: Performed by: ANESTHESIOLOGY

## 2023-02-21 PROCEDURE — 25010000002 ROPIVACAINE PER 1 MG: Performed by: ORTHOPAEDIC SURGERY

## 2023-02-21 PROCEDURE — 25010000002 EPINEPHRINE 1 MG/ML SOLUTION: Performed by: ORTHOPAEDIC SURGERY

## 2023-02-21 PROCEDURE — 20985 CPTR-ASST DIR MS PX: CPT | Performed by: ORTHOPAEDIC SURGERY

## 2023-02-21 PROCEDURE — A9270 NON-COVERED ITEM OR SERVICE: HCPCS | Performed by: ORTHOPAEDIC SURGERY

## 2023-02-21 DEVICE — CMT BONE PALACOS R HI/VISC 1X40: Type: IMPLANTABLE DEVICE | Site: KNEE | Status: FUNCTIONAL

## 2023-02-21 DEVICE — CAP TOTL KN CMT PRIMARY W/ROSA: Type: IMPLANTABLE DEVICE | Site: KNEE | Status: FUNCTIONAL

## 2023-02-21 DEVICE — STEM TIB/KN PERSONA CMT 5D SZG RT: Type: IMPLANTABLE DEVICE | Site: KNEE | Status: FUNCTIONAL

## 2023-02-21 DEVICE — SUT FW #2 W/TPR NDL 1/2 CIR 38IN 97CM 26.5MM BLU: Type: IMPLANTABLE DEVICE | Site: KNEE | Status: FUNCTIONAL

## 2023-02-21 DEVICE — IMPLANTABLE DEVICE: Type: IMPLANTABLE DEVICE | Site: KNEE | Status: FUNCTIONAL

## 2023-02-21 DEVICE — COMP FEM/KN PERSONA CR CMT COCR STD SZ10 RT: Type: IMPLANTABLE DEVICE | Site: KNEE | Status: FUNCTIONAL

## 2023-02-21 RX ORDER — ONDANSETRON 2 MG/ML
INJECTION INTRAMUSCULAR; INTRAVENOUS AS NEEDED
Status: DISCONTINUED | OUTPATIENT
Start: 2023-02-21 | End: 2023-02-21 | Stop reason: SURG

## 2023-02-21 RX ORDER — CEFAZOLIN SODIUM 2 G/100ML
2 INJECTION, SOLUTION INTRAVENOUS ONCE
Status: COMPLETED | OUTPATIENT
Start: 2023-02-21 | End: 2023-02-21

## 2023-02-21 RX ORDER — BISACODYL 10 MG
10 SUPPOSITORY, RECTAL RECTAL DAILY PRN
Status: DISCONTINUED | OUTPATIENT
Start: 2023-02-21 | End: 2023-02-23 | Stop reason: HOSPADM

## 2023-02-21 RX ORDER — TRANEXAMIC ACID 10 MG/ML
2000 INJECTION, SOLUTION INTRAVENOUS ONCE
Status: DISCONTINUED | OUTPATIENT
Start: 2023-02-21 | End: 2023-02-21

## 2023-02-21 RX ORDER — SODIUM CHLORIDE, SODIUM LACTATE, POTASSIUM CHLORIDE, CALCIUM CHLORIDE 600; 310; 30; 20 MG/100ML; MG/100ML; MG/100ML; MG/100ML
9 INJECTION, SOLUTION INTRAVENOUS CONTINUOUS PRN
Status: DISCONTINUED | OUTPATIENT
Start: 2023-02-21 | End: 2023-02-23 | Stop reason: HOSPADM

## 2023-02-21 RX ORDER — GLYCOPYRROLATE 0.2 MG/ML
0.2 INJECTION INTRAMUSCULAR; INTRAVENOUS
Status: COMPLETED | OUTPATIENT
Start: 2023-02-21 | End: 2023-02-21

## 2023-02-21 RX ORDER — BISACODYL 5 MG/1
10 TABLET, DELAYED RELEASE ORAL DAILY PRN
Status: DISCONTINUED | OUTPATIENT
Start: 2023-02-21 | End: 2023-02-23 | Stop reason: HOSPADM

## 2023-02-21 RX ORDER — HYDROCODONE BITARTRATE AND ACETAMINOPHEN 7.5; 325 MG/1; MG/1
2 TABLET ORAL EVERY 4 HOURS PRN
Status: DISCONTINUED | OUTPATIENT
Start: 2023-02-21 | End: 2023-02-23 | Stop reason: HOSPADM

## 2023-02-21 RX ORDER — OXYCODONE HYDROCHLORIDE 5 MG/1
5 TABLET ORAL
Status: DISCONTINUED | OUTPATIENT
Start: 2023-02-21 | End: 2023-02-21 | Stop reason: HOSPADM

## 2023-02-21 RX ORDER — POVIDONE-IODINE 10 MG/ML
SOLUTION TOPICAL ONCE
Status: DISCONTINUED | OUTPATIENT
Start: 2023-02-21 | End: 2023-02-21

## 2023-02-21 RX ORDER — ROPIVACAINE HYDROCHLORIDE 5 MG/ML
INJECTION, SOLUTION EPIDURAL; INFILTRATION; PERINEURAL
Status: COMPLETED | OUTPATIENT
Start: 2023-02-21 | End: 2023-02-21

## 2023-02-21 RX ORDER — ROCURONIUM BROMIDE 10 MG/ML
INJECTION, SOLUTION INTRAVENOUS AS NEEDED
Status: DISCONTINUED | OUTPATIENT
Start: 2023-02-21 | End: 2023-02-21 | Stop reason: SURG

## 2023-02-21 RX ORDER — PROPOFOL 10 MG/ML
VIAL (ML) INTRAVENOUS AS NEEDED
Status: DISCONTINUED | OUTPATIENT
Start: 2023-02-21 | End: 2023-02-21 | Stop reason: SURG

## 2023-02-21 RX ORDER — KETOROLAC TROMETHAMINE 15 MG/ML
15 INJECTION, SOLUTION INTRAMUSCULAR; INTRAVENOUS EVERY 6 HOURS
Status: COMPLETED | OUTPATIENT
Start: 2023-02-21 | End: 2023-02-22

## 2023-02-21 RX ORDER — CEFAZOLIN SODIUM 2 G/100ML
2 INJECTION, SOLUTION INTRAVENOUS EVERY 8 HOURS
Status: COMPLETED | OUTPATIENT
Start: 2023-02-21 | End: 2023-02-22

## 2023-02-21 RX ORDER — GLYCOPYRROLATE 0.2 MG/ML
INJECTION INTRAMUSCULAR; INTRAVENOUS AS NEEDED
Status: DISCONTINUED | OUTPATIENT
Start: 2023-02-21 | End: 2023-02-21 | Stop reason: SURG

## 2023-02-21 RX ORDER — ACETAMINOPHEN 500 MG
1000 TABLET ORAL ONCE
Status: COMPLETED | OUTPATIENT
Start: 2023-02-21 | End: 2023-02-21

## 2023-02-21 RX ORDER — DOCUSATE SODIUM 100 MG/1
100 CAPSULE, LIQUID FILLED ORAL 2 TIMES DAILY PRN
Status: DISCONTINUED | OUTPATIENT
Start: 2023-02-21 | End: 2023-02-23 | Stop reason: HOSPADM

## 2023-02-21 RX ORDER — KETOROLAC TROMETHAMINE 30 MG/ML
INJECTION, SOLUTION INTRAMUSCULAR; INTRAVENOUS AS NEEDED
Status: DISCONTINUED | OUTPATIENT
Start: 2023-02-21 | End: 2023-02-21 | Stop reason: SURG

## 2023-02-21 RX ORDER — DEXAMETHASONE SODIUM PHOSPHATE 4 MG/ML
INJECTION, SOLUTION INTRA-ARTICULAR; INTRALESIONAL; INTRAMUSCULAR; INTRAVENOUS; SOFT TISSUE AS NEEDED
Status: DISCONTINUED | OUTPATIENT
Start: 2023-02-21 | End: 2023-02-21 | Stop reason: SURG

## 2023-02-21 RX ORDER — EPHEDRINE SULFATE 50 MG/ML
INJECTION, SOLUTION INTRAVENOUS AS NEEDED
Status: DISCONTINUED | OUTPATIENT
Start: 2023-02-21 | End: 2023-02-21 | Stop reason: SURG

## 2023-02-21 RX ORDER — SODIUM CHLORIDE, SODIUM LACTATE, POTASSIUM CHLORIDE, CALCIUM CHLORIDE 600; 310; 30; 20 MG/100ML; MG/100ML; MG/100ML; MG/100ML
80 INJECTION, SOLUTION INTRAVENOUS CONTINUOUS
Status: DISCONTINUED | OUTPATIENT
Start: 2023-02-21 | End: 2023-02-22

## 2023-02-21 RX ORDER — HYDROCODONE BITARTRATE AND ACETAMINOPHEN 7.5; 325 MG/1; MG/1
1 TABLET ORAL EVERY 4 HOURS PRN
Status: DISCONTINUED | OUTPATIENT
Start: 2023-02-21 | End: 2023-02-23 | Stop reason: HOSPADM

## 2023-02-21 RX ORDER — PROMETHAZINE HYDROCHLORIDE 12.5 MG/1
25 TABLET ORAL ONCE AS NEEDED
Status: DISCONTINUED | OUTPATIENT
Start: 2023-02-21 | End: 2023-02-21 | Stop reason: HOSPADM

## 2023-02-21 RX ORDER — PROMETHAZINE HYDROCHLORIDE 25 MG/1
25 SUPPOSITORY RECTAL ONCE AS NEEDED
Status: DISCONTINUED | OUTPATIENT
Start: 2023-02-21 | End: 2023-02-21 | Stop reason: HOSPADM

## 2023-02-21 RX ORDER — ONDANSETRON 2 MG/ML
4 INJECTION INTRAMUSCULAR; INTRAVENOUS EVERY 6 HOURS PRN
Status: DISCONTINUED | OUTPATIENT
Start: 2023-02-21 | End: 2023-02-23 | Stop reason: HOSPADM

## 2023-02-21 RX ORDER — CEFAZOLIN SODIUM IN 0.9 % NACL 3 G/100 ML
3 INTRAVENOUS SOLUTION, PIGGYBACK (ML) INTRAVENOUS ONCE
Status: DISCONTINUED | OUTPATIENT
Start: 2023-02-21 | End: 2023-02-21 | Stop reason: DRUGHIGH

## 2023-02-21 RX ORDER — ACETAMINOPHEN 500 MG
1000 TABLET ORAL EVERY 6 HOURS
Status: DISCONTINUED | OUTPATIENT
Start: 2023-02-21 | End: 2023-02-23 | Stop reason: HOSPADM

## 2023-02-21 RX ORDER — ONDANSETRON 2 MG/ML
4 INJECTION INTRAMUSCULAR; INTRAVENOUS ONCE AS NEEDED
Status: DISCONTINUED | OUTPATIENT
Start: 2023-02-21 | End: 2023-02-21 | Stop reason: HOSPADM

## 2023-02-21 RX ORDER — CELECOXIB 100 MG/1
200 CAPSULE ORAL ONCE
Status: COMPLETED | OUTPATIENT
Start: 2023-02-21 | End: 2023-02-21

## 2023-02-21 RX ORDER — ONDANSETRON 4 MG/1
4 TABLET, FILM COATED ORAL EVERY 6 HOURS PRN
Status: DISCONTINUED | OUTPATIENT
Start: 2023-02-21 | End: 2023-02-23 | Stop reason: HOSPADM

## 2023-02-21 RX ORDER — MEPERIDINE HYDROCHLORIDE 25 MG/ML
12.5 INJECTION INTRAMUSCULAR; INTRAVENOUS; SUBCUTANEOUS
Status: DISCONTINUED | OUTPATIENT
Start: 2023-02-21 | End: 2023-02-21 | Stop reason: HOSPADM

## 2023-02-21 RX ORDER — AMOXICILLIN 250 MG
2 CAPSULE ORAL 2 TIMES DAILY PRN
Status: DISCONTINUED | OUTPATIENT
Start: 2023-02-21 | End: 2023-02-23 | Stop reason: HOSPADM

## 2023-02-21 RX ORDER — LIDOCAINE HYDROCHLORIDE 20 MG/ML
INJECTION, SOLUTION EPIDURAL; INFILTRATION; INTRACAUDAL; PERINEURAL AS NEEDED
Status: DISCONTINUED | OUTPATIENT
Start: 2023-02-21 | End: 2023-02-21 | Stop reason: SURG

## 2023-02-21 RX ORDER — MAGNESIUM HYDROXIDE 1200 MG/15ML
LIQUID ORAL AS NEEDED
Status: DISCONTINUED | OUTPATIENT
Start: 2023-02-21 | End: 2023-02-21 | Stop reason: HOSPADM

## 2023-02-21 RX ORDER — MIDAZOLAM HYDROCHLORIDE 1 MG/ML
2 INJECTION INTRAMUSCULAR; INTRAVENOUS ONCE
Status: COMPLETED | OUTPATIENT
Start: 2023-02-21 | End: 2023-02-21

## 2023-02-21 RX ORDER — NALOXONE HCL 0.4 MG/ML
0.4 VIAL (ML) INJECTION
Status: DISCONTINUED | OUTPATIENT
Start: 2023-02-21 | End: 2023-02-23 | Stop reason: HOSPADM

## 2023-02-21 RX ORDER — FENTANYL CITRATE 50 UG/ML
INJECTION, SOLUTION INTRAMUSCULAR; INTRAVENOUS AS NEEDED
Status: DISCONTINUED | OUTPATIENT
Start: 2023-02-21 | End: 2023-02-21 | Stop reason: SURG

## 2023-02-21 RX ADMIN — ROCURONIUM BROMIDE 50 MG: 10 INJECTION, SOLUTION INTRAVENOUS at 08:31

## 2023-02-21 RX ADMIN — KETOROLAC TROMETHAMINE 15 MG: 15 INJECTION, SOLUTION INTRAMUSCULAR; INTRAVENOUS at 21:21

## 2023-02-21 RX ADMIN — CEFAZOLIN SODIUM 2 G: 2 INJECTION, SOLUTION INTRAVENOUS at 08:30

## 2023-02-21 RX ADMIN — SUGAMMADEX 200 MG: 100 INJECTION, SOLUTION INTRAVENOUS at 09:55

## 2023-02-21 RX ADMIN — FENTANYL CITRATE 50 MCG: 50 INJECTION, SOLUTION INTRAMUSCULAR; INTRAVENOUS at 08:39

## 2023-02-21 RX ADMIN — ROPIVACAINE HYDROCHLORIDE 30 ML: 5 INJECTION, SOLUTION EPIDURAL; INFILTRATION; PERINEURAL at 07:53

## 2023-02-21 RX ADMIN — KETOROLAC TROMETHAMINE 30 MG: 30 INJECTION, SOLUTION INTRAMUSCULAR; INTRAVENOUS at 08:39

## 2023-02-21 RX ADMIN — ACETAMINOPHEN 1000 MG: 500 TABLET ORAL at 13:01

## 2023-02-21 RX ADMIN — ONDANSETRON 4 MG: 2 INJECTION INTRAMUSCULAR; INTRAVENOUS at 08:39

## 2023-02-21 RX ADMIN — LIDOCAINE HYDROCHLORIDE 80 MG: 20 INJECTION, SOLUTION EPIDURAL; INFILTRATION; INTRACAUDAL; PERINEURAL at 08:31

## 2023-02-21 RX ADMIN — SODIUM CHLORIDE, POTASSIUM CHLORIDE, SODIUM LACTATE AND CALCIUM CHLORIDE 9 ML/HR: 600; 310; 30; 20 INJECTION, SOLUTION INTRAVENOUS at 07:22

## 2023-02-21 RX ADMIN — ACETAMINOPHEN 1000 MG: 500 TABLET ORAL at 07:22

## 2023-02-21 RX ADMIN — HYDROMORPHONE HYDROCHLORIDE 1 MG: 1 INJECTION, SOLUTION INTRAMUSCULAR; INTRAVENOUS; SUBCUTANEOUS at 09:15

## 2023-02-21 RX ADMIN — ACETAMINOPHEN 1000 MG: 500 TABLET ORAL at 19:11

## 2023-02-21 RX ADMIN — DEXAMETHASONE SODIUM PHOSPHATE 4 MG: 4 INJECTION, SOLUTION INTRA-ARTICULAR; INTRALESIONAL; INTRAMUSCULAR; INTRAVENOUS; SOFT TISSUE at 08:39

## 2023-02-21 RX ADMIN — FENTANYL CITRATE 50 MCG: 50 INJECTION, SOLUTION INTRAMUSCULAR; INTRAVENOUS at 09:15

## 2023-02-21 RX ADMIN — MIDAZOLAM HYDROCHLORIDE 2 MG: 2 INJECTION, SOLUTION INTRAMUSCULAR; INTRAVENOUS at 07:23

## 2023-02-21 RX ADMIN — GLYCOPYRROLATE 0.2 MG: 0.2 INJECTION INTRAMUSCULAR; INTRAVENOUS at 07:24

## 2023-02-21 RX ADMIN — EPHEDRINE SULFATE 10 MG: 50 INJECTION INTRAVENOUS at 08:57

## 2023-02-21 RX ADMIN — SODIUM CHLORIDE, POTASSIUM CHLORIDE, SODIUM LACTATE AND CALCIUM CHLORIDE: 600; 310; 30; 20 INJECTION, SOLUTION INTRAVENOUS at 08:46

## 2023-02-21 RX ADMIN — FENTANYL CITRATE 50 MCG: 50 INJECTION, SOLUTION INTRAMUSCULAR; INTRAVENOUS at 08:31

## 2023-02-21 RX ADMIN — FENTANYL CITRATE 50 MCG: 50 INJECTION, SOLUTION INTRAMUSCULAR; INTRAVENOUS at 09:18

## 2023-02-21 RX ADMIN — CELECOXIB 200 MG: 100 CAPSULE ORAL at 07:22

## 2023-02-21 RX ADMIN — CEFAZOLIN SODIUM 2 G: 2 INJECTION, SOLUTION INTRAVENOUS at 16:36

## 2023-02-21 RX ADMIN — GLYCOPYRROLATE 0.2 MG: 0.2 INJECTION INTRAMUSCULAR; INTRAVENOUS at 10:11

## 2023-02-21 RX ADMIN — KETOROLAC TROMETHAMINE 15 MG: 15 INJECTION, SOLUTION INTRAMUSCULAR; INTRAVENOUS at 16:36

## 2023-02-21 RX ADMIN — CEFAZOLIN SODIUM 2 G: 2 INJECTION, SOLUTION INTRAVENOUS at 23:48

## 2023-02-21 RX ADMIN — PROPOFOL 150 MG: 10 INJECTION, EMULSION INTRAVENOUS at 08:31

## 2023-02-21 NOTE — PLAN OF CARE
Goal Outcome Evaluation:      Patient stable since arrival to unit, vitals WNL.  Up only with immobilizer, 1 assist with gait belt and walker.  Pain well controlled with oral meds.  Dressing clean, dry, and intact.

## 2023-02-21 NOTE — ANESTHESIA PREPROCEDURE EVALUATION
Anesthesia Evaluation     Patient summary reviewed and Nursing notes reviewed   no history of anesthetic complications:  NPO Solid Status: > 8 hours  NPO Liquid Status: > 2 hours           Airway   Mallampati: II  TM distance: >3 FB  Neck ROM: full  No difficulty expected  Dental      Pulmonary - normal exam    breath sounds clear to auscultation  (+) a smoker Former,   Cardiovascular - normal exam  Exercise tolerance: good (4-7 METS)    ECG reviewed  Rhythm: regular  Rate: normal    (+) hypertension, DVT, hyperlipidemia,       Neuro/Psych- negative ROS  GI/Hepatic/Renal/Endo    (+) obesity,       Musculoskeletal     Abdominal    Substance History - negative use     OB/GYN negative ob/gyn ROS         Other   arthritis,      ROS/Med Hx Other: >4MES, HX HTN, DVT POST LT TKR. ACTIVE WALKS 2MILES DAILY. TJR.  KT                 Anesthesia Plan    ASA 3     general and general with block     (Patient understands anesthesia not responsible for dental damage.)  intravenous induction     Anesthetic plan, risks, benefits, and alternatives have been provided, discussed and informed consent has been obtained with: patient.    Use of blood products discussed with patient .   Plan discussed with CRNA.        CODE STATUS:

## 2023-02-21 NOTE — ANESTHESIA PROCEDURE NOTES
Peripheral Block      Patient reassessed immediately prior to procedure    Patient location during procedure: pre-op  Reason for block: at surgeon's request and post-op pain management  Preanesthetic Checklist  Completed: patient identified, IV checked, site marked, risks and benefits discussed, surgical consent, monitors and equipment checked, pre-op evaluation and timeout performed  Prep:  Pt Position: supine  Sterile barriers:alcohol skin prep, partial drape, cap, washed/disinfected hands, mask and gloves  Prep: ChloraPrep  Patient monitoring: blood pressure monitoring, continuous pulse oximetry and EKG  Procedure    Sedation: yes  Performed under: local infiltration  Guidance:ultrasound guided and nerve stimulator    ULTRASOUND INTERPRETATION.  Using ultrasound guidance a 20 G gauge needle was placed in close proximity to the nerve, at which point, under ultrasound guidance anesthetic was injected in the area of the nerve and spread of the anesthesia was seen on ultrasound in close proximity thereto.  There were no abnormalities seen on ultrasound; a digital image was taken; and the patient tolerated the procedure with no complications. Images:still images obtained, printed/placed on chart    Laterality:right  Block Type:adductor canal block  Injection Technique:single-shot  Needle Type:echogenic  Needle Gauge:20 G (4in)  Resistance on Injection: none    Medications Used: ropivacaine (NAROPIN) 0.5 % injection - Injection   30 mL - 2/21/2023 7:53:00 AM      Post Assessment  Injection Assessment: negative aspiration for heme, no paresthesia on injection and incremental injection  Patient Tolerance:comfortable throughout block  Complications:no  Additional Notes  The block or continuous infusion is requested by the referring physician for management of postoperative pain, or pain related to a procedure. Ultrasound guidance (deemed medically necessary). Painless injection, pt was awake and conversant during the  procedure without complications. Needle and surrounding structures visualized throughout procedure. No adverse reactions or complications seen during this period. Post-procedure image showed no signs of complication, and anatomy was consistent with an uncomplicated nerve blockade.

## 2023-02-21 NOTE — PLAN OF CARE
Goal Outcome Evaluation:  Plan of Care Reviewed With: patient           Outcome Evaluation: Patient presents with decreased strength, transfers and ambulation.  Skilled physical therapy services will be required to address these mobility deficits.  Recommend home with outpatient therapy services

## 2023-02-21 NOTE — H&P
"h and p      Chief Complaint  Initial Evaluation of the Right Knee        Subjective          Herb Dimas presents to [facility] for initial evaluation of the right knee. In  he had surgery on the left knee and had blood clots and difficulty after that surgery.  He is here to discuss right knee surgery. He has had no new injury or falls.  He is having more difficulty with ambulation and prolonged standing .     No Known Allergies      Social History   Social History            Socioeconomic History   • Marital status:    Tobacco Use   • Smoking status: Former       Packs/day: 0.50       Years: 10.00       Pack years: 5.00       Types: Cigarettes       Quit date:        Years since quittin.9   • Smokeless tobacco: Never   Vaping Use   • Vaping Use: Never used   Substance and Sexual Activity   • Alcohol use: Yes       Comment: current some day drinks weekly beer   • Drug use: Never   • Sexual activity: Defer            Review of Systems            Objective      Vital Signs:   Pulse 74   Ht 182.9 cm (72\")   Wt 104 kg (230 lb)   SpO2 98%   BMI 31.19 kg/m²        Physical Exam  Constitutional:       Appearance: Normal appearance. Patient is well-developed and normal weight.   HENT:      Head: Normocephalic.      Right Ear: Hearing and external ear normal.      Left Ear: Hearing and external ear normal.      Nose: Nose normal.   Eyes:      Conjunctiva/sclera: Conjunctivae normal.   Cardiovascular:      Rate and Rhythm: Normal rate.   Pulmonary:      Effort: Pulmonary effort is normal.      Breath sounds: No wheezing or rales.   Abdominal:      Palpations: Abdomen is soft.      Tenderness: There is no abdominal tenderness.   Musculoskeletal:      Cervical back: Normal range of motion.   Skin:     Findings: No rash.   Neurological:      Mental Status: Patient  is alert and oriented to person, place, and time.   Psychiatric:         Mood and Affect: Mood and affect " normal.         Judgment: Judgment normal.         Ortho Exam       RIGHT KNEE ROM 0-105. Dorsal pedal pulse 2+. Posterior tibialis pulse is 2+. Good tone of hip flexors, hip extensors, and hip abductors. Slight swelling.  Calf supple. Sensation intact. Neurovascular Intact. Marked Varus deformity.      Procedures                   Imaging Results (Most Recent)      Procedure Component Value Units Date/Time     XR Knee 3 View Right [[id number]] Resulted: 12/05/22 0946       Updated: 12/05/22 0952                   Result Review    :      X-Ray Report:  Right knee(s) X-Ray  Indication: Evaluation of the right knee.   AP/Lateral and [place] view(s)  Findings: Moderate osseous abnormality, no dislocation or fracture. Severe arthritis.   Prior studies available for comparison:No                    Assessment      Assessment and Plan      Diagnoses and all orders for this visit:     1. Right knee pain, unspecified chronicity (Primary)  -     XR Knee 3 View Right     2. Osteoarthritis of right knee, unspecified osteoarthritis type           Discussed the treatment plan with the patient. Discussed the treatment options with the patient, operative vs non-operative. The patient wants to proceed with right total knee arthroplasty and understands the risks and benefits. He did have a previous DVT with his left knee.       Discussed surgery., Risks/benefits discussed with patient including, but not limited to: infection, bleeding, neurovascular damage, malunion, nonunion, aesthetic deformity, need for further surgery, and death., Discussed with patient the implant type being used during surgery and patient understands and desires to proceed. and Surgery pamphlet given.     Follow Up      Postoperatively         Malcolm Ceja MD  02/21/23

## 2023-02-21 NOTE — THERAPY EVALUATION
Acute Care - Physical Therapy Initial Evaluation  [facility]     Patient Name: Herb Dimas  : 1950  MRN: [id number]  Today's Date: 2023     Admit date: 2023     Referring Physician: Mihai Posey DO     Surgery Date:2023   Procedure(s) (LRB):  TOTAL KNEE ARTHROPLASTY WITH SHARATH ROBOT (Right)                Visit Dx:     ICD-10-CM ICD-9-CM   1. Difficulty in walking  R26.2 719.7   2. Osteoarthritis of right knee, unspecified osteoarthritis type  M17.11 715.96     Patient Active Problem List   Diagnosis   • Vitamin D deficiency   • Hypertension, essential   • Elevated liver enzymes   • Mixed hyperlipidemia   • Deep vein thrombosis (DVT) of calf muscle vein of left lower extremity (HCC)   • Primary osteoarthritis of both knees   • Screening PSA (prostate specific antigen)   • IFG (impaired fasting glucose)   • Right calf pain   • Osteoarthrosis, localized, primary, knee, right   • PSA elevation     Past Medical History:   Diagnosis Date   • Arthritis    • Deep vein thrombosis (HCC)     Following knee surgery   • High blood pressure    • High cholesterol    • Right knee pain      Past Surgical History:   Procedure Laterality Date   • APPENDECTOMY     • BURSECTOMY Right     elbow   • CARDIAC CATHETERIZATION     • COLONOSCOPY     • REPLACEMENT TOTAL KNEE Left 2010     PT Assessment (last 12 hours)     PT Evaluation and Treatment     Row Name 23 1100          Physical Therapy Time and Intention    Subjective Information no complaints  -JEY     Document Type evaluation  -JEY     Mode of Treatment individual therapy;physical therapy  -JEY     Patient Effort good  -JEY     Row Name 23 1100          General Information    Patient Observations alert;cooperative;agree to therapy  -JEY     Prior Level of Function independent:;all household mobility;community mobility  -JEY     Equipment Currently Used at Home none  -JEY     Existing Precautions/Restrictions fall;weight bearing;brace worn  when out of bed  WBAT in knee immobilizer: ok to remove in bed: PROM only to 45°: absolutely no active knee extension  -JEY     Barriers to Rehab none identified  -JEY     Row Name 02/21/23 1100          Living Environment    Current Living Arrangements home  -JEY     People in Home spouse  -JEY     Row Name 02/21/23 1100          Range of Motion (ROM)    Range of Motion right lower extremity  0-45° PROM  -JEY     Row Name 02/21/23 1100          Strength (Manual Muscle Testing)    Strength (Manual Muscle Testing) right lower extremity  3/5  -JEY     Row Name 02/21/23 1100          Mobility    Extremity Weight-bearing Status right lower extremity  -JEY     Right Lower Extremity (Weight-bearing Status) weight-bearing as tolerated (WBAT)  in knee immobilizer  -JEY     Row Name 02/21/23 1100          Bed Mobility    Bed Mobility bed mobility (all) activities;supine-sit  -JEY     All Activities, [place] (Bed Mobility) contact guard  -JEY     Supine-Sit [place] (Bed Mobility) contact guard  -JEY     Row Name 02/21/23 1100          Transfers    Transfers bed-chair transfer;sit-stand transfer  -JEY     Row Name 02/21/23 1100          Bed-Chair Transfer    Bed-Chair [place] (Transfers) contact guard  -JEY     Assistive Device (Bed-Chair Transfers) walker, front-wheeled  -JEY     Row Name 02/21/23 1100          Sit-Stand Transfer    Sit-Stand [place] (Transfers) contact guard  -JEY     Assistive Device (Sit-Stand Transfers) walker, front-wheeled  -JEY     Row Name 02/21/23 1100          Gait/Stairs (Locomotion)    Gait/Stairs Locomotion gait/ambulation assistive device  -JEY     [place] Level (Gait) contact guard  -JEY     Assistive Device (Gait) walker, front-wheeled  -JEY     Ambulated day of surgery or within 4 hours of PACU discharge yes  -JEY     Distance in Feet (Gait) 25  -JEY     Row Name 02/21/23 1100          Safety Issues, Functional Mobility    Impairments Affecting Function (Mobility) balance;pain;range of  motion (ROM);strength  -JEY     Row Name 02/21/23 1100          Balance    Balance Assessment standing dynamic balance  -JEY     Dynamic Standing Balance contact guard  -JEY     Position/Device Used, Standing Balance walker, front-wheeled  -JEY     Row Name             Wound 02/21/23 0857 Right anterior knee Incision    Wound - Properties Group Placement Date: 02/21/23  -HK Placement Time: 0857  -HK Present on Hospital Admission: N  -HK Side: Right  -HK Orientation: anterior  -HK Location: knee  -HK Primary Wound Type: Incision  -HK    Retired Wound - Properties Group Placement Date: 02/21/23  -HK Placement Time: 0857  -HK Present on Hospital Admission: N  -HK Side: Right  -HK Orientation: anterior  -HK Location: knee  -HK Primary Wound Type: Incision  -HK    Retired Wound - Properties Group Date first assessed: 02/21/23  -HK Time first assessed: 0857  -HK Present on Hospital Admission: N  -HK Side: Right  -HK Location: knee  -HK Primary Wound Type: Incision  -HK    Row Name 02/21/23 1100          Plan of Care Review    Plan of Care Reviewed With patient  -JEY     Outcome Evaluation Patient presents with decreased strength, transfers and ambulation.  Skilled physical therapy services will be required to address these mobility deficits.  Recommend home with outpatient therapy services  -JEY     Row Name 02/21/23 1100          Therapy Assessment/Plan (PT)    Patient/Family Therapy Goals Statement (PT) Patient was to have less pain  -JEY     Rehab Potential (PT) good, to achieve stated therapy goals  -JEY     Criteria for Skilled Interventions Met (PT) skilled treatment is necessary  -JEY     Therapy Frequency (PT) 2 times/day  -JEY     Predicted Duration of Therapy Intervention (PT) 10 days  -JEY     Problem List (PT) problems related to;balance;mobility;range of motion (ROM);strength;pain  -JEY     Activity Limitations Related to Problem List (PT) unable to transfer safely;unable to ambulate safely  -JEY     Row Name 02/21/23  1100          PT Evaluation Complexity    History, PT Evaluation Complexity no personal factors and/or comorbidities  -JEY     Examination of Body Systems (PT Eval Complexity) total of 4 or more elements  -JEY     Clinical Presentation (PT Evaluation Complexity) stable  -JEY     Clinical Decision Making (PT Evaluation Complexity) low complexity  -JEY     Overall Complexity (PT Evaluation Complexity) low complexity  -JEY     Row Name 02/21/23 1100          Therapy Plan Review/Discharge Plan (PT)    Therapy Plan Review (PT) evaluation/treatment results reviewed;participants voiced agreement with care plan;participants included;patient  -JEY     Row Name 02/21/23 1100          Physical Therapy Goals    Transfer Goal Selection (PT) transfer, PT goal 1  -JEY     Gait Training Goal Selection (PT) gait training, PT goal 1  -JEY     Row Name 02/21/23 1100          Transfer Goal 1 (PT)    Activity/Assistive Device (Transfer Goal 1, PT) transfers, all  -JEY     [place] Level/Cues Needed (Transfer Goal 1, PT) independent  -JEY     Time Frame (Transfer Goal 1, PT) long term goal (LTG);10 days  -JEY     Row Name 02/21/23 1100          Gait Training Goal 1 (PT)    Activity/Assistive Device (Gait Training Goal 1, PT) gait (walking locomotion);assistive device use;walker, rolling  -JEY     [place] Level (Gait Training Goal 1, PT) independent  -JEY     Distance (Gait Training Goal 1, PT) 300  -JEY     Time Frame (Gait Training Goal 1, PT) long term goal (LTG);10 days  -JEY           User Key  (r) = Recorded By, (t) = Taken By, (c) = Cosigned By    Initials Name Provider Type    Betty Sultana, RN Registered Nurse    Cody Mcrae, PT Physical Therapist                Physical Therapy Education     Title: PT OT SLP Therapies (Done)     Topic: Physical Therapy (Done)     Point: Mobility training (Done)     Learning Progress Summary           Patient Acceptance, E,TB, VU by JEY at 2/21/2023 1142                   Point: Precautions  (Done)     Learning Progress Summary           Patient Acceptance, E,TB, VU by JEY at 2/21/2023 1142                               User Key     Initials Effective Dates Name Provider Type Discipline    JEY 06/03/21 -  Cody Norton PT Physical Therapist PT              PT Recommendation and Plan  Anticipated Discharge Disposition (PT): home with outpatient therapy services  Planned Therapy Interventions (PT): balance training, bed mobility training, gait training, home exercise program, stair training, strengthening, stretching, transfer training  Therapy Frequency (PT): 2 times/day  Plan of Care Reviewed With: patient  Outcome Evaluation: Patient presents with decreased strength, transfers and ambulation.  Skilled physical therapy services will be required to address these mobility deficits.  Recommend home with outpatient therapy services   Outcome Measures     Row Name 02/21/23 1100             How much help from another person do you currently need...    Turning from your back to your side while in flat bed without using bedrails? 3  -JEY      Moving from lying on back to sitting on the side of a flat bed without bedrails? 3  -JEY      Moving to and from a bed to a chair (including a wheelchair)? 3  -JEY      Standing up from a chair using your arms (e.g., wheelchair, bedside chair)? 3  -JEY      Climbing 3-5 steps with a railing? 3  -JEY      To walk in hospital room? 3  -JEY      AM-PAC 6 Clicks Score (PT) 18  -JEY         Functional Assessment    Outcome Measure Options AM-PAC 6 Clicks Basic Mobility (PT)  -JEY            User Key  (r) = Recorded By, (t) = Taken By, (c) = Cosigned By    Initials Name Provider Type    Cody Mcrae PT Physical Therapist                 Time Calculation:    PT Charges     Row Name 02/21/23 1134             Time Calculation    PT Received On 02/21/23  -JEY      PT Goal Re-Cert Due Date 03/02/23  -JEY         Untimed Charges    PT Eval/Re-eval Minutes 35  -JEY         Total Minutes     Untimed Charges Total Minutes 35  -JEY       Total Minutes 35  -JEY            User Key  (r) = Recorded By, (t) = Taken By, (c) = Cosigned By    Initials Name Provider Type    Cody Mcrae, PT Physical Therapist              Therapy Charges for Today     Code Description Service Date Service Provider Modifiers Qty    31050560154 HC PT EVAL LOW COMPLEXITY 3 2/21/2023 Cody Norton, PT GP 1          PT G-Codes  Outcome Measure Options: AM-PAC 6 Clicks Basic Mobility (PT)  AM-PAC 6 Clicks Score (PT): 18    Cody Norton, PT  2/21/2023

## 2023-02-21 NOTE — CONSULTS
[facility]   Hospitalist Consult Note  Date: 2023   Patient Name: Herb Dimas  : 1950  MRN: [id number]  Primary Care Physician:  Clemente Park MD  Referring Physician: Clemente Park, *  Date of admission: 2023    Subjective   Subjective     Reason for Consult/ Chief Complaint: Osteoarthritis of the right knee status post right total knee replacement    HPI:  Herb Dimas is a 72 y.o. male  who has a history of severe degenerative changes/osteoarthritis of the right knee.  The patient has been treated conservatively for many months if not years.  Despite conservative therapy, symptoms of right knee pain, stiffness, instability, weakness, and occasional swelling have progressed.  The symptoms occurred with activity but sometimes at rest.  Activities of daily living and quality of life are being affected by this.  The patient was admitted to the hospital for elective right knee arthroplasty which was performed on 2023  by Dr. Ceja.        Personal History     Past Medical History:  Past Medical History:   Diagnosis Date   • Arthritis    • Deep vein thrombosis (HCC)     Following knee surgery   • High blood pressure    • High cholesterol    • Right knee pain          Past Surgical History:  Past Surgical History:   Procedure Laterality Date   • APPENDECTOMY     • BURSECTOMY Right     elbow   • CARDIAC CATHETERIZATION     • COLONOSCOPY     • REPLACEMENT TOTAL KNEE Left          Family History:   Family History   Problem Relation Age of Onset   • Diabetes Father    • Hyperlipidemia Father    • Stroke Father    • Breast cancer Sister         60s   • Cancer Mother          Social History:   Social History     Socioeconomic History   • Marital status:    Tobacco Use   • Smoking status: Former     Packs/day: 0.50     Years: 5.00     Pack years: 2.50     Types: Cigarettes     Start date: 1970     Quit date: 1976     Years since quittin.2    • Smokeless tobacco: Never   Vaping Use   • Vaping Use: Never used   Substance and Sexual Activity   • Alcohol use: Yes     Alcohol/week: 4.0 standard drinks     Types: 4 Cans of beer per week     Comment: Occasionally   • Drug use: Never   • Sexual activity: Not Currently     Partners: Female         Home Medications:  amLODIPine, aspirin, chlorthalidone, cholecalciferol, irbesartan, multivitamin with minerals, rosuvastatin, sildenafil, valACYclovir, and vitamin C    Allergies:  No Known Allergies      Objective    Objective     Vitals:   Temp:  [97.1 °F (36.2 °C)-98 °F (36.7 °C)] 97.3 °F (36.3 °C)  Heart Rate:  [50-81] 66  Resp:  [12-20] 15  BP: (114-177)/(52-83) 120/52  Flow (L/min):  [2-4] 2    Physical Exam:   Constitutional: Awake, alert, no acute distress   Eyes: Pupils equal, sclerae anicteric, no conjunctival injection   Respiratory: Clear to auscultation bilaterally, nonlabored respirations    Cardiovascular: RRR, no murmurs, rubs, or gallops, palpable pedal pulses bilaterally   Gastrointestinal: Positive bowel sounds, soft, nontender, nondistended   Musculoskeletal: Right knee in knee immobilizer   Psychiatric: Appropriate affect, cooperative   Neurologic: Oriented x 3, speech clear      Assessment & Plan   Assessment / Plan     Assessment/Plan:  • Right knee osteoarthritis    History of Deep vein thrombosis (2010)  High blood pressure  High cholesterol      The patient will be monitored in the hospital for postoperative care  Pain control with IV and oral narcotic pain medication  Physical therapy and occupational therapy consult   consult for disposition assistance  Initiate bowel regimen to avoid constipation with pain medication use  Continue appropriate outpatient medications for chronic stable illnesses once reviewed  DVT prophylaxis:  Medical and mechanical DVT prophylaxis orders are present.    CODE STATUS:    Code Status (Patient has no pulse and is not breathing): CPR (Attempt  to Resuscitate)  Medical Interventions (Patient has pulse or is breathing): Full Support

## 2023-02-21 NOTE — ANESTHESIA POSTPROCEDURE EVALUATION
Patient: Herb Dimas    Procedure Summary     Date: 02/21/23 Room / Location: [facility] OR 06 / [facility] MAIN OR    Anesthesia Start: 0828 Anesthesia Stop: 1030    Procedure: TOTAL KNEE ARTHROPLASTY WITH SHARATH ROBOT (Right: Knee) Diagnosis:       Osteoarthritis of right knee, unspecified osteoarthritis type      (Osteoarthritis of right knee, unspecified osteoarthritis type [M17.11])    Surgeons: Malcolm Ceja MD Provider: Michelle Woodard MD    Anesthesia Type: general, general with block ASA Status: 3          Anesthesia Type: general, general with block    Vitals  Vitals Value Taken Time   /52 02/21/23 1100   Temp 36.3 °C (97.3 °F) 02/21/23 1100   Pulse 61 02/21/23 1102   Resp 15 02/21/23 1100   SpO2 94 % 02/21/23 1102   Vitals shown include unvalidated device data.        Post Anesthesia Care and Evaluation    Patient location during evaluation: bedside  Patient participation: complete - patient participated  Level of consciousness: awake  Pain management: adequate    Airway patency: patent  Anesthetic complications: No anesthetic complications  PONV Status: none  Cardiovascular status: acceptable and stable  Respiratory status: acceptable  Hydration status: acceptable    Comments: An Anesthesiologist personally participated in the most demanding procedures (including induction and emergence if applicable) in the anesthesia plan, monitored the course of anesthesia administration at frequent intervals and remained physically present and available for immediate diagnosis and treatment of emergencies.

## 2023-02-22 ENCOUNTER — TELEPHONE (OUTPATIENT)
Dept: ORTHOPEDIC SURGERY | Facility: CLINIC | Age: 73
End: 2023-02-22
Payer: MEDICARE

## 2023-02-22 LAB
DEPRECATED RDW RBC AUTO: 41.3 FL (ref 37–54)
ERYTHROCYTE [DISTWIDTH] IN BLOOD BY AUTOMATED COUNT: 12.5 % (ref 12.3–15.4)
HCT VFR BLD AUTO: 32.7 % (ref 37.5–51)
HGB BLD-MCNC: 11.4 G/DL (ref 13–17.7)
HOLD SPECIMEN: NORMAL
MCH RBC QN AUTO: 31.8 PG (ref 26.6–33)
MCHC RBC AUTO-ENTMCNC: 34.9 G/DL (ref 31.5–35.7)
MCV RBC AUTO: 91.3 FL (ref 79–97)
PLATELET # BLD AUTO: 167 10*3/MM3 (ref 140–450)
PMV BLD AUTO: 9.4 FL (ref 6–12)
RBC # BLD AUTO: 3.58 10*6/MM3 (ref 4.14–5.8)
WBC NRBC COR # BLD: 9.57 10*3/MM3 (ref 3.4–10.8)

## 2023-02-22 PROCEDURE — A9270 NON-COVERED ITEM OR SERVICE: HCPCS | Performed by: ORTHOPAEDIC SURGERY

## 2023-02-22 PROCEDURE — 63710000001 LOSARTAN 50 MG TABLET: Performed by: INTERNAL MEDICINE

## 2023-02-22 PROCEDURE — A9270 NON-COVERED ITEM OR SERVICE: HCPCS | Performed by: INTERNAL MEDICINE

## 2023-02-22 PROCEDURE — 63710000001 HYDROCODONE-ACETAMINOPHEN 7.5-325 MG TABLET: Performed by: ORTHOPAEDIC SURGERY

## 2023-02-22 PROCEDURE — 63710000001 ROSUVASTATIN 20 MG TABLET: Performed by: INTERNAL MEDICINE

## 2023-02-22 PROCEDURE — 25010000002 KETOROLAC TROMETHAMINE PER 15 MG: Performed by: ORTHOPAEDIC SURGERY

## 2023-02-22 PROCEDURE — 97116 GAIT TRAINING THERAPY: CPT

## 2023-02-22 PROCEDURE — 97530 THERAPEUTIC ACTIVITIES: CPT

## 2023-02-22 PROCEDURE — 63710000001 AMLODIPINE 5 MG TABLET: Performed by: INTERNAL MEDICINE

## 2023-02-22 PROCEDURE — 63710000001 ACETAMINOPHEN 500 MG TABLET: Performed by: ORTHOPAEDIC SURGERY

## 2023-02-22 PROCEDURE — 97165 OT EVAL LOW COMPLEX 30 MIN: CPT

## 2023-02-22 PROCEDURE — 85027 COMPLETE CBC AUTOMATED: CPT | Performed by: ORTHOPAEDIC SURGERY

## 2023-02-22 PROCEDURE — 97535 SELF CARE MNGMENT TRAINING: CPT

## 2023-02-22 PROCEDURE — 94799 UNLISTED PULMONARY SVC/PX: CPT

## 2023-02-22 PROCEDURE — 63710000001 CHOLECALCIFEROL 25 MCG (1000 UT) TABLET: Performed by: INTERNAL MEDICINE

## 2023-02-22 PROCEDURE — 97110 THERAPEUTIC EXERCISES: CPT

## 2023-02-22 PROCEDURE — 63710000001 APIXABAN 2.5 MG TABLET: Performed by: ORTHOPAEDIC SURGERY

## 2023-02-22 RX ORDER — ROSUVASTATIN CALCIUM 20 MG/1
20 TABLET, COATED ORAL NIGHTLY
Status: DISCONTINUED | OUTPATIENT
Start: 2023-02-22 | End: 2023-02-23 | Stop reason: HOSPADM

## 2023-02-22 RX ORDER — AMLODIPINE BESYLATE 5 MG/1
5 TABLET ORAL NIGHTLY
Status: DISCONTINUED | OUTPATIENT
Start: 2023-02-22 | End: 2023-02-23 | Stop reason: HOSPADM

## 2023-02-22 RX ORDER — MELATONIN
1000 NIGHTLY
Status: DISCONTINUED | OUTPATIENT
Start: 2023-02-22 | End: 2023-02-23 | Stop reason: HOSPADM

## 2023-02-22 RX ORDER — LOSARTAN POTASSIUM 50 MG/1
100 TABLET ORAL
Status: DISCONTINUED | OUTPATIENT
Start: 2023-02-22 | End: 2023-02-23 | Stop reason: HOSPADM

## 2023-02-22 RX ADMIN — AMLODIPINE BESYLATE 5 MG: 5 TABLET ORAL at 20:15

## 2023-02-22 RX ADMIN — ACETAMINOPHEN 1000 MG: 500 TABLET ORAL at 18:52

## 2023-02-22 RX ADMIN — APIXABAN 2.5 MG: 2.5 TABLET, FILM COATED ORAL at 09:29

## 2023-02-22 RX ADMIN — Medication 1000 UNITS: at 20:18

## 2023-02-22 RX ADMIN — HYDROCODONE BITARTRATE AND ACETAMINOPHEN 1 TABLET: 7.5; 325 TABLET ORAL at 15:06

## 2023-02-22 RX ADMIN — KETOROLAC TROMETHAMINE 15 MG: 15 INJECTION, SOLUTION INTRAMUSCULAR; INTRAVENOUS at 05:00

## 2023-02-22 RX ADMIN — HYDROCODONE BITARTRATE AND ACETAMINOPHEN 2 TABLET: 7.5; 325 TABLET ORAL at 20:15

## 2023-02-22 RX ADMIN — LOSARTAN POTASSIUM 100 MG: 50 TABLET, FILM COATED ORAL at 09:48

## 2023-02-22 RX ADMIN — KETOROLAC TROMETHAMINE 15 MG: 15 INJECTION, SOLUTION INTRAMUSCULAR; INTRAVENOUS at 09:29

## 2023-02-22 RX ADMIN — ROSUVASTATIN 20 MG: 20 TABLET, FILM COATED ORAL at 20:14

## 2023-02-22 RX ADMIN — APIXABAN 2.5 MG: 2.5 TABLET, FILM COATED ORAL at 20:14

## 2023-02-22 RX ADMIN — HYDROCODONE BITARTRATE AND ACETAMINOPHEN 1 TABLET: 7.5; 325 TABLET ORAL at 00:03

## 2023-02-22 RX ADMIN — HYDROCODONE BITARTRATE AND ACETAMINOPHEN 1 TABLET: 7.5; 325 TABLET ORAL at 09:28

## 2023-02-22 NOTE — TELEPHONE ENCOUNTER
PER DR ATWOOD, AKASH RN WAS NOTIFIED AT 2 NORTH FOR PATIENT TO HAVE A CPM FOR HOME USE.  ALSO, MESSAGE WAS GIVEN TO SUSY AT PHYSICAL THERAPY [facility] WITH SPECIFIC PT ORDERS FOR PATIENT.  (DR ATWOOD NOTES THAT THE ORDERS ARE PLACED IN HIS NOTES AT THE HOSPITAL).  ADDITIONALLY, CALL WAS PLACED TO DUY AT [facility] WHO WILL BE SEEING THE PATIENT AFTER DISCHARGE FROM [facility] TO MAKE SURE THEY ARE AWARE OF PHYSICAL THERAPY LIMITATIONS.    SETTING ON CPM IS 0 TO 45 DEGREES FLEXION--SET,  0 TO 65 DEGREES PASSIVE WITH THERAPIST.  NO ACTIVE KNEE EXTENSION!, 2ND WEEK OF THERAPY CPM CAN BE INCREASE TO 60 DEGREES.  ISOMETRIC QUAD EXERCISES WITH NO ACTIVE QUAD EXERCISES.

## 2023-02-22 NOTE — PLAN OF CARE
Goal Outcome Evaluation:      Pt complained of mild pain once during the shift. It was controlled with oral medication. Pt was able ambulate from the bed to the chair with 2 assist and walker. VSS. Pt anticipates being discharged with home health. Toi Mukherjee RN

## 2023-02-22 NOTE — PLAN OF CARE
Goal Outcome Evaluation:  Plan of Care Reviewed With: patient        Progress: improving  Outcome Evaluation: Patient presents with limitations affecting strength, activity tolerance, and balance impacting patient's ability to return home safely and independently.  The skills of a therapist will be required to safely and effectively implement the following treatment plan to restore maximal level of function

## 2023-02-22 NOTE — THERAPY TREATMENT NOTE
Acute Care - Physical Therapy Treatment Note   [place]     Patient Name: Herb Dimas  : 1950  MRN: [id number]  Today's Date: 2023      Visit Dx:     ICD-10-CM ICD-9-CM   1. Difficulty in walking  R26.2 719.7   2. Osteoarthritis of right knee, unspecified osteoarthritis type  M17.11 715.96     Patient Active Problem List   Diagnosis   • Vitamin D deficiency   • Hypertension, essential   • Elevated liver enzymes   • Mixed hyperlipidemia   • Deep vein thrombosis (DVT) of calf muscle vein of left lower extremity (HCC)   • Primary osteoarthritis of both knees   • Screening PSA (prostate specific antigen)   • IFG (impaired fasting glucose)   • Right calf pain   • Osteoarthrosis, localized, primary, knee, right   • PSA elevation     Past Medical History:   Diagnosis Date   • Arthritis    • Deep vein thrombosis (HCC)     Following knee surgery   • High blood pressure    • High cholesterol    • Right knee pain      Past Surgical History:   Procedure Laterality Date   • APPENDECTOMY     • BURSECTOMY Right     elbow   • CARDIAC CATHETERIZATION     • COLONOSCOPY     • REPLACEMENT TOTAL KNEE Left    • TOTAL KNEE ARTHROPLASTY Right 2023    Procedure: TOTAL KNEE ARTHROPLASTY WITH SHARATH ROBOT;  Surgeon: Malcolm Ceja MD;  Location: [facility], [place];  Service: Robotics - Ortho;  Laterality: Right;     PT Assessment (last 12 hours)     PT Evaluation and Treatment     Row Name 23 0852          Physical Therapy Time and Intention    Subjective Information complains of;weakness;pain  -RH     Document Type therapy note (daily note)  -     Mode of Treatment physical therapy;individual therapy  -     Patient Effort good  -     Comment Pt was educated in need to avoid using the muscles that straighten his R knee.  -     Row Name 23 0852          Pain    Additional Documentation Pain Scale: FACES Pre/Post-Treatment (Group)  -     Row Name 23 0852          Pain Scale: FACES  Pre/Post-Treatment    Pain: FACES Scale, Pretreatment 0-->no hurt  -RH     Posttreatment Pain Rating 2-->hurts little bit  -RH     Pain Location - Side/Orientation Right  -RH     Pain Location - knee  -RH     Row Name 02/22/23 0852          Range of Motion (ROM)    Range of Motion --  Pt R knee PROM at 30 degrees flex and 8 degrees ext.  -     Row Name 02/22/23 0852          Mobility    Extremity Weight-bearing Status right lower extremity  -RH     Right Lower Extremity (Weight-bearing Status) weight-bearing as tolerated (WBAT)  -     Row Name 02/22/23 0852          Transfers    Transfers sit-stand transfer;stand-sit transfer  -RH     Maintains Weight-bearing Status (Transfers) able to maintain  -     Row Name 02/22/23 0852          Sit-Stand Transfer    Sit-Stand [place] (Transfers) contact guard  -     Assistive Device (Sit-Stand Transfers) walker, front-wheeled  -     Row Name 02/22/23 0852          Stand-Sit Transfer    Stand-Sit [place] (Transfers) contact guard  -     Assistive Device (Stand-Sit Transfers) walker, front-wheeled  -     Row Name 02/22/23 0852          Gait/Stairs (Locomotion)    Gait/Stairs Locomotion gait/ambulation independence;gait/ambulation assistive device;distance ambulated;gait pattern;gait deviations  -     [place] Level (Gait) contact guard  -     Assistive Device (Gait) walker, front-wheeled  -     Distance in Feet (Gait) 125  -RH     Pattern (Gait) 3-point;step-to  -RH     Deviations/Abnormal Patterns (Gait) base of support, narrow;gait speed decreased;stride length decreased  -RH     Bilateral Gait Deviations heel strike decreased  -RH     Right Sided Gait Deviations hip hiking  -RH     Gait Assessment/Intervention Pt amb with RW and CGA with R WBAT and R knee immobilizer in place.  -RH     Negotiation (Stairs) stairs independence;stairs assistive device;handrail location;number of steps;ascending technique;descending technique  -RH      [place] Level (Stairs) contact guard  -     Handrail Location (Stairs) both sides  -     Number of Steps (Stairs) 5 x 2  -RH     Ascending Technique (Stairs) step-to-step  -RH     Descending Technique (Stairs) step-to-step  -     Row Name 02/22/23 0852          Balance    Balance Assessment standing dynamic balance  -     Dynamic Standing Balance contact guard  -     Position/Device Used, Standing Balance walker, front-wheeled  -RH     Row Name 02/22/23 0852          Motor Skills    Therapeutic Exercise hip;knee;ankle  -     Row Name 02/22/23 0852          Hip (Therapeutic Exercise)    Hip (Therapeutic Exercise) AAROM (active assistive range of motion);isometric exercises;strengthening exercise  -     Hip AAROM (Therapeutic Exercise) aDduction;aBduction;bilateral;supine;10 repetitions;2 sets;flexion;extension  R knee immobilizer in place.  -     Hip Isometrics (Therapeutic Exercise) gluteal sets;bilateral;10 repetitions;2 sets;supine  -     Row Name 02/22/23 0852          Knee (Therapeutic Exercise)    Knee (Therapeutic Exercise) AAROM (active assistive range of motion);PROM (passive range of motion);isometric exercises;strengthening exercise  -     Knee PROM (Therapeutic Exercise) flexion;extension;right;supine;10 repetitions  2 sets  -     Knee Isometrics (Therapeutic Exercise) left;quad sets;supine;10 repetitions;2 sets  -     Knee Strengthening (Therapeutic Exercise) SLR (straight leg raise);left;supine;10 repetitions;2 sets  -     Row Name 02/22/23 0852          Ankle (Therapeutic Exercise)    Ankle (Therapeutic Exercise) AROM (active range of motion)  -     Ankle AROM (Therapeutic Exercise) bilateral;dorsiflexion;plantarflexion;supine;10 repetitions;2 sets  -     Row Name             Wound 02/21/23 0857 Right anterior knee Incision    Wound - Properties Group Placement Date: 02/21/23  -HK Placement Time: 0857  -HK Present on Hospital Admission: N  -HK Side: Right  -HK  Orientation: anterior  -HK Location: knee  -HK Primary Wound Type: Incision  -HK    Retired Wound - Properties Group Placement Date: 02/21/23  -HK Placement Time: 0857  -HK Present on Hospital Admission: N  -HK Side: Right  -HK Orientation: anterior  -HK Location: knee  -HK Primary Wound Type: Incision  -HK    Retired Wound - Properties Group Date first assessed: 02/21/23  -HK Time first assessed: 0857  -HK Present on Hospital Admission: N  -HK Side: Right  -HK Location: knee  -HK Primary Wound Type: Incision  -HK    Row Name 02/22/23 0852          Progress Summary (PT)    Progress Toward Functional Goals (PT) progress toward functional goals is good  -RH           User Key  (r) = Recorded By, (t) = Taken By, (c) = Cosigned By    Initials Name Provider Type    HK Betty Overton, RN Registered Nurse    Jame Zayas PTA Physical Therapist Assistant                Physical Therapy Education     Title: PT OT SLP Therapies (Done)     Topic: Physical Therapy (Done)     Point: Mobility training (Done)     Learning Progress Summary           Patient Acceptance, E,TB, VU by JEY at 2/21/2023 1142                   Point: Precautions (Done)     Learning Progress Summary           Patient Acceptance, E,TB, VU by JEY at 2/21/2023 1142                               User Key     Initials Effective Dates Name Provider Type Discipline    JEY 06/03/21 -  Cody Norton, PT Physical Therapist PT              PT Recommendation and Plan     Progress Summary (PT)  Progress Toward Functional Goals (PT): progress toward functional goals is good   Outcome Measures     Row Name 02/22/23 0900 02/21/23 1100          How much help from another person do you currently need...    Turning from your back to your side while in flat bed without using bedrails? 3  -RH 3  -JEY     Moving from lying on back to sitting on the side of a flat bed without bedrails? 3  -RH 3  -JEY     Moving to and from a bed to a chair (including a wheelchair)? 3  -RH 3  -JEY      Standing up from a chair using your arms (e.g., wheelchair, bedside chair)? 3  -RH 3  -JEY     Climbing 3-5 steps with a railing? 4  -RH 3  -JEY     To walk in hospital room? 4  -RH 3  -JEY     AM-PAC 6 Clicks Score (PT) 20  -RH 18  -JEY        Functional Assessment    Outcome Measure Options -- AM-PAC 6 Clicks Basic Mobility (PT)  -JEY           User Key  (r) = Recorded By, (t) = Taken By, (c) = Cosigned By    Initials Name Provider Type     Jame Ortega PTA Physical Therapist Assistant    Cody Mcrae, PT Physical Therapist                 Time Calculation:    PT Charges     Row Name 02/22/23 0850             Time Calculation    PT Received On 02/22/23  -RH         Timed Charges    38536 - PT Therapeutic Exercise Minutes 23  -RH      83012 - Gait Training Minutes  8  -RH      25071 - PT Therapeutic Activity Minutes 7  -RH         Total Minutes    Timed Charges Total Minutes 38  -RH       Total Minutes 38  -RH            User Key  (r) = Recorded By, (t) = Taken By, (c) = Cosigned By    Initials Name Provider Type     Jame Ortega PTA Physical Therapist Assistant              Therapy Charges for Today     Code Description Service Date Service Provider Modifiers Qty    39048197271 HC PT THER PROC EA 15 MIN 2/22/2023 Jame Ortega PTA GP 2    18015656509 HC GAIT TRAINING EA 15 MIN 2/22/2023 Jame Ortega PTA GP 1          PT G-Codes  Outcome Measure Options: AM-PAC 6 Clicks Basic Mobility (PT)  AM-PAC 6 Clicks Score (PT): 20    Jame Ortega PTA  2/22/2023

## 2023-02-22 NOTE — PLAN OF CARE
PT note:    CPM was delivered to the room and applied to pt at 1130a on 2/21/23.  Per orthopedic physician orders setting were placed at 0-45°.  Pt to wear 2x per day for 2hrs at a time.

## 2023-02-22 NOTE — THERAPY TREATMENT NOTE
Acute Care - Physical Therapy Treatment Note   [place]     Patient Name: Herb Dimas  : 1950  MRN: [id number]  Today's Date: 2023      Visit Dx:     ICD-10-CM ICD-9-CM   1. Difficulty in walking  R26.2 719.7   2. Osteoarthritis of right knee, unspecified osteoarthritis type  M17.11 715.96   3. Decreased activities of daily living (ADL)  Z78.9 V49.89     Patient Active Problem List   Diagnosis   • Vitamin D deficiency   • Hypertension, essential   • Elevated liver enzymes   • Mixed hyperlipidemia   • Deep vein thrombosis (DVT) of calf muscle vein of left lower extremity (HCC)   • Primary osteoarthritis of both knees   • Screening PSA (prostate specific antigen)   • IFG (impaired fasting glucose)   • Right calf pain   • Osteoarthrosis, localized, primary, knee, right   • PSA elevation     Past Medical History:   Diagnosis Date   • Arthritis    • Deep vein thrombosis (HCC)     Following knee surgery   • High blood pressure    • High cholesterol    • Right knee pain      Past Surgical History:   Procedure Laterality Date   • APPENDECTOMY     • BURSECTOMY Right     elbow   • CARDIAC CATHETERIZATION     • COLONOSCOPY     • REPLACEMENT TOTAL KNEE Left    • TOTAL KNEE ARTHROPLASTY Right 2023    Procedure: TOTAL KNEE ARTHROPLASTY WITH SHARATH ROBOT;  Surgeon: Malcolm Ceja MD;  Location: [facility] MAIN OR;  Service: Robotics - Ortho;  Laterality: Right;     PT Assessment (last 12 hours)     PT Evaluation and Treatment     Row Name 23 1451 23 0852       Physical Therapy Time and Intention    Subjective Information complains of;weakness;pain  -RH complains of;weakness;pain  -RH    Document Type therapy note (daily note)  -RH therapy note (daily note)  -RH    Mode of Treatment physical therapy;individual therapy  -RH physical therapy;individual therapy  -RH    Patient Effort good  -RH good  -RH    Comment Pt motion now allowed per MD are at 0 degrees to 60 degrees passively.  -RH  Pt was educated in need to avoid using the muscles that straighten his R knee.  -    Row Name 02/22/23 0852          Pain    Additional Documentation Pain Scale: FACES Pre/Post-Treatment (Group)  -     Row Name 02/22/23 1451 02/22/23 0852       Pain Scale: FACES Pre/Post-Treatment    Pain: FACES Scale, Pretreatment 0-->no hurt  -RH 0-->no hurt  -RH    Posttreatment Pain Rating 2-->hurts little bit  -RH 2-->hurts little bit  -RH    Pain Location - Side/Orientation Right  -RH Right  -RH    Pain Location - knee  -RH knee  -    Row Name 02/22/23 1451 02/22/23 0852       Range of Motion (ROM)    Range of Motion --  Pt R knee PROM at 40 degrees flex and 8 degrees ext.  -RH --  Pt R knee PROM at 30 degrees flex and 8 degrees ext.  -    Row Name 02/22/23 1451 02/22/23 0852       Mobility    Extremity Weight-bearing Status right lower extremity  - right lower extremity  -    Right Lower Extremity (Weight-bearing Status) weight-bearing as tolerated (WBAT)  - weight-bearing as tolerated (WBAT)  -    Row Name 02/22/23 1451          Bed Mobility    Bed Mobility scooting/bridging;supine-sit;sit-supine  -     All Activities, [place] (Bed Mobility) contact guard;minimum assist (75% patient effort)  -     Scooting/Bridging [place] (Bed Mobility) contact guard;minimum assist (75% patient effort)  -     Supine-Sit [place] (Bed Mobility) contact guard;minimum assist (75% patient effort)  -     Sit-Supine [place] (Bed Mobility) contact guard;minimum assist (75% patient effort)  -     Bed Mobility, Safety Issues --  Pt has been reminded not to use his quadricep muscles to raise his RLE.  -     Assistive Device (Bed Mobility) bed rails  -     Comment, (Bed Mobility) Pt with R knee immobilizer in place.  -     Row Name 02/22/23 1451 02/22/23 0852       Transfers    Transfers sit-stand transfer;stand-sit transfer  - sit-stand transfer;stand-sit transfer  -    Maintains Weight-bearing  Status (Transfers) -- able to maintain  -RH    Row Name 02/22/23 1451 02/22/23 0852       Sit-Stand Transfer    Sit-Stand [place] (Transfers) contact guard  -RH contact guard  -RH    Assistive Device (Sit-Stand Transfers) walker, front-wheeled  -RH walker, front-wheeled  -RH    Row Name 02/22/23 1451 02/22/23 0852       Stand-Sit Transfer    Stand-Sit [place] (Transfers) contact guard  -RH contact guard  -RH    Assistive Device (Stand-Sit Transfers) walker, front-wheeled  -RH walker, front-wheeled  -RH    Row Name 02/22/23 1451 02/22/23 0852       Gait/Stairs (Locomotion)    Gait/Stairs Locomotion gait/ambulation independence;gait/ambulation assistive device;distance ambulated;gait pattern;gait deviations  -RH gait/ambulation independence;gait/ambulation assistive device;distance ambulated;gait pattern;gait deviations  -RH    [place] Level (Gait) contact guard  -RH contact guard  -RH    Assistive Device (Gait) walker, front-wheeled  -RH walker, front-wheeled  -RH    Distance in Feet (Gait) 125  -  -RH    Pattern (Gait) 3-point;step-through  -RH 3-point;step-to  -RH    Deviations/Abnormal Patterns (Gait) antalgic;base of support, narrow;gait speed decreased;stride length decreased  -RH base of support, narrow;gait speed decreased;stride length decreased  -RH    Bilateral Gait Deviations heel strike decreased  -RH heel strike decreased  -RH    Right Sided Gait Deviations -- hip hiking  -RH    Gait Assessment/Intervention Pt amb with RW and CGA with 3 point step-through gain with R knee immobilizer in place and WBAT.  -RH Pt amb with RW and CGA with R WBAT and R knee immobilizer in place.  -RH    Negotiation (Stairs) -- stairs independence;stairs assistive device;handrail location;number of steps;ascending technique;descending technique  -RH    [place] Level (Stairs) -- contact guard  -RH    Handrail Location (Stairs) -- both sides  -RH    Number of Steps (Stairs) -- 5 x 2  -RH    Ascending  Technique (Stairs) -- step-to-step  -    Descending Technique (Stairs) -- step-to-step  -RH    Row Name 02/22/23 1451 02/22/23 0852       Balance    Balance Assessment -- standing dynamic balance  -    Dynamic Standing Balance contact guard  - contact guard  -    Position/Device Used, Standing Balance walker, front-wheeled  -RH walker, front-wheeled  -RH    Row Name 02/22/23 1451 02/22/23 0852       Motor Skills    Therapeutic Exercise hip;ankle  - hip;knee;ankle  -    Row Name 02/22/23 1451 02/22/23 0852       Hip (Therapeutic Exercise)    Hip (Therapeutic Exercise) AROM (active range of motion);AAROM (active assistive range of motion);isometric exercises  -RH AAROM (active assistive range of motion);isometric exercises;strengthening exercise  -    Hip AAROM (Therapeutic Exercise) right;flexion;extension;aBduction;aDduction;10 repetitions;2 sets  -RH aDduction;aBduction;bilateral;supine;10 repetitions;2 sets;flexion;extension  R knee immobilizer in place.  -    Hip Isometrics (Therapeutic Exercise) bilateral;gluteal sets;supine;10 repetitions;2 sets  -RH gluteal sets;bilateral;10 repetitions;2 sets;supine  -    Row Name 02/22/23 1451 02/22/23 0852       Knee (Therapeutic Exercise)    Knee (Therapeutic Exercise) PROM (passive range of motion)  - AAROM (active assistive range of motion);PROM (passive range of motion);isometric exercises;strengthening exercise  -    Knee AAROM (Therapeutic Exercise) 10 repetitions;2 sets;right;supine  -RH --    Knee PROM (Therapeutic Exercise) flexion;extension;10 repetitions;supine  2 sets  -RH flexion;extension;right;supine;10 repetitions  2 sets  -RH    Knee Isometrics (Therapeutic Exercise) quad sets;left;supine;10 repetitions;2 sets  -RH left;quad sets;supine;10 repetitions;2 sets  -RH    Knee Strengthening (Therapeutic Exercise) -- SLR (straight leg raise);left;supine;10 repetitions;2 sets  -    Row Name 02/22/23 1451 02/22/23 0852       Ankle  (Therapeutic Exercise)    Ankle (Therapeutic Exercise) AROM (active range of motion)  -RH AROM (active range of motion)  -RH    Ankle AROM (Therapeutic Exercise) bilateral;dorsiflexion;plantarflexion;supine;10 repetitions;2 sets  -RH bilateral;dorsiflexion;plantarflexion;supine;10 repetitions;2 sets  -RH    Row Name             Wound 02/21/23 0857 Right anterior knee Incision    Wound - Properties Group Placement Date: 02/21/23  -HK Placement Time: 0857  -HK Present on Hospital Admission: N  -HK Side: Right  -HK Orientation: anterior  -HK Location: knee  -HK Primary Wound Type: Incision  -HK    Retired Wound - Properties Group Placement Date: 02/21/23  -HK Placement Time: 0857  -HK Present on Hospital Admission: N  -HK Side: Right  -HK Orientation: anterior  -HK Location: knee  -HK Primary Wound Type: Incision  -HK    Retired Wound - Properties Group Date first assessed: 02/21/23  -HK Time first assessed: 0857  -HK Present on Hospital Admission: N  -HK Side: Right  -HK Location: knee  -HK Primary Wound Type: Incision  -HK    Row Name 02/22/23 1451 02/22/23 0852       Progress Summary (PT)    Progress Toward Functional Goals (PT) progress toward functional goals is fair  -RH progress toward functional goals is good  -RH          User Key  (r) = Recorded By, (t) = Taken By, (c) = Cosigned By    Initials Name Provider Type     Betty Overton RN Registered Nurse    Jame Zayas PTA Physical Therapist Assistant                Physical Therapy Education     Title: PT OT SLP Therapies (Done)     Topic: Physical Therapy (Done)     Point: Mobility training (Done)     Learning Progress Summary           Patient Acceptance, E,D, DU by DESIRAE at 2/22/2023 0924    Acceptance, E,TB, VU by JEY at 2/21/2023 1142                   Point: Precautions (Done)     Learning Progress Summary           Patient Acceptance, E,D, DU by DESIRAE at 2/22/2023 0924    Acceptance, E,TB, VU by JEY at 2/21/2023 1142                               User Key      Initials Effective Dates Name Provider Type Discipline    PG 06/16/21 -  [place], OT Occupational Therapist OT    JEY 06/03/21 -  Cody Norton, SARI Physical Therapist PT              PT Recommendation and Plan     Progress Summary (PT)  Progress Toward Functional Goals (PT): progress toward functional goals is fair   Outcome Measures     Row Name 02/22/23 0900 02/21/23 1100          How much help from another person do you currently need...    Turning from your back to your side while in flat bed without using bedrails? 3  -RH 3  -JEY     Moving from lying on back to sitting on the side of a flat bed without bedrails? 3  -RH 3  -JEY     Moving to and from a bed to a chair (including a wheelchair)? 3  -RH 3  -JEY     Standing up from a chair using your arms (e.g., wheelchair, bedside chair)? 3  -RH 3  -JEY     Climbing 3-5 steps with a railing? 4  -RH 3  -JEY     To walk in hospital room? 4  -RH 3  -JEY     AM-PAC 6 Clicks Score (PT) 20  -RH 18  -JEY        Functional Assessment    Outcome Measure Options -- AM-PAC 6 Clicks Basic Mobility (PT)  -JEY           User Key  (r) = Recorded By, (t) = Taken By, (c) = Cosigned By    Initials Name Provider Type    RH Jame Ortega PTA Physical Therapist Assistant    Cody Mcrae, PT Physical Therapist                 Time Calculation:    PT Charges     Row Name 02/22/23 1450 02/22/23 0850          Time Calculation    PT Received On 02/22/23  -RH 02/22/23  -RH        Timed Charges    70441 - PT Therapeutic Exercise Minutes 17  -RH 23  -RH     91297 - Gait Training Minutes  4  -RH 8  -RH     42475 - PT Therapeutic Activity Minutes 4  -RH 7  -RH        Total Minutes    Timed Charges Total Minutes 25  -RH 38  -RH      Total Minutes 25  -RH 38  -RH           User Key  (r) = Recorded By, (t) = Taken By, (c) = Cosigned By    Initials Name Provider Type    Jame Zayas PTA Physical Therapist Assistant              Therapy Charges for Today     Code Description Service  Date Service Provider Modifiers Qty    17841039600 HC PT THER PROC EA 15 MIN 2/22/2023 Jame Ortega, PTA GP 2    37078857261 HC GAIT TRAINING EA 15 MIN 2/22/2023 Jame Ortega, PTA GP 1    18150241690 HC PT THER PROC EA 15 MIN 2/22/2023 Jame Ortega, PTA GP 1    53686645900 HC GAIT TRAINING EA 15 MIN 2/22/2023 Jame Ortega, JT GP 1          PT G-Codes  Outcome Measure Options: AM-PAC 6 Clicks Daily Activity (OT), Optimal Instrument  AM-PAC 6 Clicks Score (PT): 18  AM-PAC 6 Clicks Score (OT): 20    Jame Ortega PTA  2/22/2023

## 2023-02-22 NOTE — PROGRESS NOTES
"DAILY PROGRESS NOTE  HOSPITALIST GROUP      PATIENT IDENTIFICATION    Name: Herb Dimas  :  1950  MRN: [id number]    CHIEF COMPLAINT/PRINCIPAL DIAGNOSIS: Osteoarthrosis, localized, primary, knee, right       SUBJECTIVE    Pain ok. Ambulating with PT. No light-headedness.     ROS:   Gen: No fever or chills  CV: no chest pain or palpitation  Resp: no shortness of breath or cough  GI: no nausea, vomiting, diarrhea  Neuro: no headache or dizziness     OBJECTIVE     Exam:  /64 (BP Location: Left arm, Patient Position: Sitting)   Pulse 57   Temp 98.4 °F (36.9 °C) (Oral)   Resp 17   Ht 180.3 cm (71\")   Wt 110 kg (242 lb 15.2 oz)   SpO2 96%   BMI 33.88 kg/m²   Intake/Output last 24 hours:    Intake/Output Summary (Last 24 hours) at 2023 0915  Last data filed at 2023 0523  Gross per 24 hour   Intake 965 ml   Output 950 ml   Net 15 ml        Gen: NAD, up in bed  Resp: CTAB, no increased work of breathing  CV: RRR, no m/r/g. No peripheral edema  GI: Soft, nontender, (+) BS. nondistended  Psych: Alert and Oriented x 3, Mood and affect appropriate to situation  Skin: warm and dry on palpation. No rash on inspection.  Neuro: moves all 4 extremities, follows simple commands    DATA REVIEW:  Lab Results (last 24 hours)     Procedure Component Value Units Date/Time    Extra Tubes [[id number]] Collected: 23    Specimen: Blood, Venous Line Updated: 23    Narrative:      The following orders were created for panel order Extra Tubes.  Procedure                               Abnormality         Status                     ---------                               -----------         ------                     Green Top (Gel)[[id number]]                                  Final result                 Please view results for these tests on the individual orders.    Green Top (Gel) [[id number]] Collected: 23    Specimen: Blood Updated: 23     Extra Tube Hold for add-ons. "     Comment: Auto resulted.       CBC (No Diff) [[id number]]  (Abnormal) Collected: 02/22/23 0427    Specimen: Blood Updated: 02/22/23 0539     WBC 9.57 10*3/mm3      RBC 3.58 10*6/mm3      Hemoglobin 11.4 g/dL      Hematocrit 32.7 %      MCV 91.3 fL      MCH 31.8 pg      MCHC 34.9 g/dL      RDW 12.5 %      RDW-SD 41.3 fl      MPV 9.4 fL      Platelets 167 10*3/mm3           Imaging Results (Last 24 Hours)     Procedure Component Value Units Date/Time    XR Knee 1 or 2 View Right [[id number]] Collected: 02/21/23 1122     Updated: 02/21/23 1125    Narrative:      PROCEDURE: XR KNEE 1 OR 2 VW RIGHT     COMPARISON: [facility] , CR, XR KNEE 3 VW RIGHT, 12/05/2022, 9:57.     INDICATIONS: Post-Op Right Knee Arthoplasty     FINDINGS:   Patient is status post total knee arthroplasty with patellar resurfacing study is limited by   overlying artifact from knee stabilizers/brace.  The orthopedic hardware appears intact.  Skin   staples overlie the anterior aspect of the knee.  Complex air and fluid collection noted within the   joint space and soft tissues.       Impression:         1. Expected postoperative changes from total knee arthroplasty with patellar resurfacing               ERYN KING MD         Electronically Signed and Approved By: ERYN KING MD on 2/21/2023 at 11:22                           Labs and imaging noted above have been personally reviewed by me.    Scheduled Meds:acetaminophen, 1,000 mg, Oral, Q6H  amLODIPine, 5 mg, Oral, Nightly  apixaban, 2.5 mg, Oral, Q12H  cholecalciferol, 1,000 Units, Oral, Nightly  ketorolac, 15 mg, Intravenous, Q6H  losartan, 100 mg, Oral, Q24H  rosuvastatin, 20 mg, Oral, Nightly  tranexamic acid, 2,000 mg, Topical, Once      Continuous Infusions:lactated ringers, 9 mL/hr, Last Rate: Stopped (02/21/23 1026)  lactated ringers, 80 mL/hr, Last Rate: 80 mL/hr (02/21/23 1130)      PRN Meds:•  bisacodyl  •  bisacodyl  •  docusate sodium  •   HYDROcodone-acetaminophen  •  HYDROcodone-acetaminophen  •  lactated ringers  •  magnesium hydroxide  •  Morphine **AND** naloxone  •  ondansetron **OR** ondansetron  •  senna-docusate sodium    ASSESSMENT/PLAN      Osteoarthrosis, localized, primary, knee, right    R Knee OA  - s/p TKA on 2/21  - pain control  - on eliquis  - PT/OT    HTN  - resume home amlodipine and irbesartan  - resume chlorthalidone ion am if BP allows  - recheck renal fxn and K in am    HPLD  - resume statin    Nutrition - Diet: Regular/House Diet; Texture: Regular Texture (IDDSI 7); Fluid Consistency: Thin (IDDSI 0)   DVT Prophylaxis - eliquis  Code Status - full   GI ppx - none  Disposition - home today or tomorrow       Alessio Simons MD  Hospitalist Group  2/22/2023  09:15 EST

## 2023-02-23 VITALS
HEART RATE: 70 BPM | TEMPERATURE: 98.8 F | WEIGHT: 242.95 LBS | DIASTOLIC BLOOD PRESSURE: 69 MMHG | BODY MASS INDEX: 34.01 KG/M2 | SYSTOLIC BLOOD PRESSURE: 125 MMHG | RESPIRATION RATE: 19 BRPM | HEIGHT: 71 IN | OXYGEN SATURATION: 91 %

## 2023-02-23 LAB
ANION GAP SERPL CALCULATED.3IONS-SCNC: 9.1 MMOL/L (ref 5–15)
BUN SERPL-MCNC: 29 MG/DL (ref 8–23)
BUN/CREAT SERPL: 24.4 (ref 7–25)
CALCIUM SPEC-SCNC: 8.8 MG/DL (ref 8.6–10.5)
CHLORIDE SERPL-SCNC: 100 MMOL/L (ref 98–107)
CO2 SERPL-SCNC: 23.9 MMOL/L (ref 22–29)
CREAT SERPL-MCNC: 1.19 MG/DL (ref 0.76–1.27)
EGFRCR SERPLBLD CKD-EPI 2021: 65.3 ML/MIN/1.73
GLUCOSE SERPL-MCNC: 114 MG/DL (ref 65–99)
POTASSIUM SERPL-SCNC: 4.2 MMOL/L (ref 3.5–5.2)
SODIUM SERPL-SCNC: 133 MMOL/L (ref 136–145)
WHOLE BLOOD HOLD SPECIMEN: NORMAL

## 2023-02-23 PROCEDURE — 80048 BASIC METABOLIC PNL TOTAL CA: CPT | Performed by: INTERNAL MEDICINE

## 2023-02-23 PROCEDURE — 97530 THERAPEUTIC ACTIVITIES: CPT

## 2023-02-23 PROCEDURE — 63710000001 APIXABAN 2.5 MG TABLET: Performed by: ORTHOPAEDIC SURGERY

## 2023-02-23 PROCEDURE — A9270 NON-COVERED ITEM OR SERVICE: HCPCS | Performed by: ORTHOPAEDIC SURGERY

## 2023-02-23 PROCEDURE — 99213 OFFICE O/P EST LOW 20 MIN: CPT | Performed by: INTERNAL MEDICINE

## 2023-02-23 PROCEDURE — 94799 UNLISTED PULMONARY SVC/PX: CPT

## 2023-02-23 PROCEDURE — 97116 GAIT TRAINING THERAPY: CPT

## 2023-02-23 PROCEDURE — 63710000001 LOSARTAN 50 MG TABLET: Performed by: INTERNAL MEDICINE

## 2023-02-23 PROCEDURE — 97110 THERAPEUTIC EXERCISES: CPT

## 2023-02-23 PROCEDURE — A9270 NON-COVERED ITEM OR SERVICE: HCPCS | Performed by: INTERNAL MEDICINE

## 2023-02-23 PROCEDURE — 63710000001 HYDROCODONE-ACETAMINOPHEN 7.5-325 MG TABLET: Performed by: ORTHOPAEDIC SURGERY

## 2023-02-23 PROCEDURE — 97535 SELF CARE MNGMENT TRAINING: CPT

## 2023-02-23 RX ORDER — ASPIRIN 325 MG
325 TABLET, DELAYED RELEASE (ENTERIC COATED) ORAL DAILY
Qty: 7 TABLET | Refills: 0 | Status: SHIPPED | OUTPATIENT
Start: 2023-02-23

## 2023-02-23 RX ORDER — HYDROCODONE BITARTRATE AND ACETAMINOPHEN 7.5; 325 MG/1; MG/1
1 TABLET ORAL EVERY 4 HOURS PRN
Qty: 45 TABLET | Refills: 0 | Status: SHIPPED | OUTPATIENT
Start: 2023-02-23 | End: 2023-03-02 | Stop reason: SDUPTHER

## 2023-02-23 RX ADMIN — HYDROCODONE BITARTRATE AND ACETAMINOPHEN 1 TABLET: 7.5; 325 TABLET ORAL at 13:32

## 2023-02-23 RX ADMIN — LOSARTAN POTASSIUM 100 MG: 50 TABLET, FILM COATED ORAL at 08:45

## 2023-02-23 RX ADMIN — HYDROCODONE BITARTRATE AND ACETAMINOPHEN 1 TABLET: 7.5; 325 TABLET ORAL at 00:27

## 2023-02-23 RX ADMIN — HYDROCODONE BITARTRATE AND ACETAMINOPHEN 1 TABLET: 7.5; 325 TABLET ORAL at 08:45

## 2023-02-23 RX ADMIN — APIXABAN 2.5 MG: 2.5 TABLET, FILM COATED ORAL at 08:44

## 2023-02-23 NOTE — DISCHARGE SUMMARY
Physician Discharge Summary  Patient Identification  PATIENT IDENTIFICATION    Name: Herb Dimas  :  1950  MRN: [id number]    Admit date: 2023    Discharge date: 2023     Admitting Physician: Malcolm Ceja MD     Discharge Physician: Clemente Simons MD     Admission Diagnoses: Osteoarthritis of right knee, unspecified osteoarthritis type [M17.11]  Osteoarthrosis, localized, primary, knee, right [M17.11]    Hospital Problems:   Principal Problem:  Osteoarthrosis, localized, primary, knee, right   Active Problems:  Problems Addressed this Visit    None  Visit Diagnoses     Difficulty in walking    -  Primary    Relevant Orders    Miscellaneous DME    Osteoarthritis of right knee, unspecified osteoarthritis type        Relevant Orders    Miscellaneous DME    Decreased activities of daily living (ADL)          Diagnoses       Codes Comments    Difficulty in walking    -  Primary ICD-10-CM: R26.2  ICD-9-CM: 719.7     Osteoarthritis of right knee, unspecified osteoarthritis type     ICD-10-CM: M17.11  ICD-9-CM: 715.96     Decreased activities of daily living (ADL)     ICD-10-CM: Z78.9  ICD-9-CM: V49.89            Discharged Condition: stable    Consults: IP CONSULT TO HOSPITALIST  IP CONSULT TO CASE MANAGEMENT     Imaging:   Imaging Results (Last 24 Hours)     ** No results found for the last 24 hours. **          Labs:   Lab Results (last 24 hours)     Procedure Component Value Units Date/Time    Extra Tubes [[id number]] Collected: 23    Specimen: Blood, Venous Line Updated: 23    Narrative:      The following orders were created for panel order Extra Tubes.  Procedure                               Abnormality         Status                     ---------                               -----------         ------                     Lavender Top[[id number]]                                     Final result                 Please view results for these tests on  the individual orders.    Faye Gan [[id number]] Collected: 02/23/23 0606    Specimen: Blood Updated: 02/23/23 0616     Extra Tube hold for add-on     Comment: Auto resulted       Basic Metabolic Panel [[id number]]  (Abnormal) Collected: 02/23/23 0427    Specimen: Blood Updated: 02/23/23 0527     Glucose 114 mg/dL      BUN 29 mg/dL      Creatinine 1.19 mg/dL      Sodium 133 mmol/L      Potassium 4.2 mmol/L      Chloride 100 mmol/L      CO2 23.9 mmol/L      Calcium 8.8 mg/dL      BUN/Creatinine Ratio 24.4     Anion Gap 9.1 mmol/L      eGFR 65.3 mL/min/1.73     Narrative:      GFR Normal >60  Chronic Kidney Disease <60  Kidney Failure <15    The GFR formula is only valid for adults with stable renal function between ages 18 and 70.            Hospital Course:   72 y.o. male  who has a history of severe degenerative changes/osteoarthritis of the right knee admitted for R TKA    R Knee OA  - s/p TKA on 2/21  - pain control  - on eliquis  - PT/OT as outpt     HTN  - resume home amlodipine, irbesartan and chlorthalidone at d/c     HPLD  - statin    Discharge Exam:  Gen: up in bed, in NAD  CV:  RRR, no m/r/g, no peripheral edema  Resp: CTAB, no increase work of breathing  Abd: soft, NT, ND, bs present  Neuro: moves all 4 ext, following commands  Ext: no clubbing, cyanosis or edema  Psych: AAOx3, pleasant affect    Disposition: Home    Patient Discharge Medications:      Discharge Medications      ASK your doctor about these medications      Instructions Start Date   amLODIPine 5 MG tablet  Commonly known as: NORVASC   TAKE 1 TABLET AT BEDTIME      aspirin 81 MG chewable tablet   81 mg, Oral, Every 24 Hours, Krys/Been to call with med directive      chlorthalidone 25 MG tablet  Commonly known as: HYGROTON   TAKE 1 TABLET EVERY DAY      cholecalciferol 25 MCG (1000 UT) tablet  Commonly known as: VITAMIN D3   1,000 Units, Oral, Nightly      irbesartan 300 MG tablet  Commonly known as: AVAPRO   TAKE 1 TABLET EVERY DAY       multivitamin with minerals tablet tablet   1 tablet, Oral, Nightly      rosuvastatin 20 MG tablet  Commonly known as: CRESTOR   TAKE 1 TABLET AT BEDTIME      sildenafil 50 MG tablet  Commonly known as: Viagra   50 mg, Oral, Daily PRN      vitamin C 250 MG tablet  Commonly known as: ASCORBIC ACID   500 mg, Oral, Nightly                   Signed:  Alessio Simons MD  [facility]  2/23/2023  08:37 EST      I spent 21 minutes arranging and coordinating discharge and reviewing medications with majority of time spent counseling patient

## 2023-02-23 NOTE — PLAN OF CARE
Goal Outcome Evaluation:      Patient has been stable throughout shift, vitals WNL.  Ambulates 1 assist with gait belt, walker, and immobilizer when up.  Pain well controlled with oral pain meds.  Dressing clean, dry, and intact.

## 2023-02-23 NOTE — THERAPY TREATMENT NOTE
Acute Care - Physical Therapy Treatment Note   Leyla     Patient Name: Herb Dimas  : 1950  MRN: [id number]  Today's Date: 2023      Visit Dx:     ICD-10-CM ICD-9-CM   1. Difficulty in walking  R26.2 719.7   2. Osteoarthritis of right knee, unspecified osteoarthritis type  M17.11 715.96   3. Decreased activities of daily living (ADL)  Z78.9 V49.89     Patient Active Problem List   Diagnosis   • Vitamin D deficiency   • Hypertension, essential   • Elevated liver enzymes   • Mixed hyperlipidemia   • Deep vein thrombosis (DVT) of calf muscle vein of left lower extremity (HCC)   • Primary osteoarthritis of both knees   • Screening PSA (prostate specific antigen)   • IFG (impaired fasting glucose)   • Right calf pain   • Osteoarthrosis, localized, primary, knee, right   • PSA elevation     Past Medical History:   Diagnosis Date   • Arthritis    • Deep vein thrombosis (HCC)     Following knee surgery   • High blood pressure    • High cholesterol    • Right knee pain      Past Surgical History:   Procedure Laterality Date   • APPENDECTOMY     • BURSECTOMY Right     elbow   • CARDIAC CATHETERIZATION     • COLONOSCOPY     • REPLACEMENT TOTAL KNEE Left    • TOTAL KNEE ARTHROPLASTY Right 2023    Procedure: TOTAL KNEE ARTHROPLASTY WITH SHARATH ROBOT;  Surgeon: Malcolm Ceja MD;  [facility] MAIN OR;  Service: Robotics - Ortho;  Laterality: Right;     PT Assessment (last 12 hours)     PT Evaluation and Treatment     Row Name 23 09          Physical Therapy Time and Intention    Subjective Information complains of;weakness;pain  -RH     Document Type therapy note (daily note)  -RH     Mode of Treatment physical therapy;individual therapy  -RH     Patient Effort good  -RH     Row Name 23          Pain    Posttreatment Pain Rating 6/10  -RH     Pain Location - Side/Orientation Right  -RH     Pain Location - knee  -RH     Pain Intervention(s) Nursing Notified;Emotional  support  -     Row Name 02/23/23 0906          Range of Motion (ROM)    Range of Motion --  Pt R knee PROM at 40 degrees flex and 8 degrees ext.  -     Row Name 02/23/23 0906          Mobility    Extremity Weight-bearing Status right lower extremity  -RH     Right Lower Extremity (Weight-bearing Status) weight-bearing as tolerated (WBAT)  -     Row Name 02/23/23 0906          Transfers    Transfers sit-stand transfer;stand-sit transfer  -RH     Maintains Weight-bearing Status (Transfers) able to maintain  -     Comment, (Transfers) Pt requires assistance to lower recliner due to being in immobilizer.  -[facility] Name 02/23/23 0906          Sit-Stand Transfer    Sit-Stand [place] (Transfers) contact guard  -     Assistive Device (Sit-Stand Transfers) walker, front-wheeled  -     Comment, (Sit-Stand Transfer) Pt in R knee immobilizer.  -[facility] Name 02/23/23 0906          Stand-Sit Transfer    Stand-Sit [place] (Transfers) contact guard  -     Assistive Device (Stand-Sit Transfers) walker, front-wheeled  -[facility] Name 02/23/23 0906          Gait/Stairs (Locomotion)    Gait/Stairs Locomotion gait/ambulation independence;gait/ambulation assistive device;distance ambulated;gait pattern;gait deviations  -     [place] Level (Gait) contact guard  -     Assistive Device (Gait) walker, front-wheeled  -     Distance in Feet (Gait) 150  -RH     Pattern (Gait) 3-point;step-through  -     Deviations/Abnormal Patterns (Gait) antalgic;base of support, wide;gait speed decreased;stride length decreased  -     Bilateral Gait Deviations heel strike decreased  -     Gait Assessment/Intervention Pt amb with RW and CGA with R knee immobilizer in place and 3 point step-through gait pattern.  -     Negotiation (Stairs) stairs independence;stairs assistive device;handrail location;number of steps;ascending technique;descending technique  -     [place] Level (Stairs) contact guard  -      Handrail Location (Stairs) both sides  -     Number of Steps (Stairs) 5  -RH     Ascending Technique (Stairs) step-to-step  -RH     Descending Technique (Stairs) step-to-step  -RH     Row Name 02/23/23 0906          Balance    Dynamic Standing Balance contact guard  -     Position/Device Used, Standing Balance walker, front-wheeled  -     Row Name 02/23/23 0906          Motor Skills    Therapeutic Exercise hip;knee;ankle  -     Row Name 02/23/23 0906          Hip (Therapeutic Exercise)    Hip (Therapeutic Exercise) AROM (active range of motion);isometric exercises  -     Hip AROM (Therapeutic Exercise) bilateral;flexion;extension;aBduction;aDduction;20 repititions  -     Hip Isometrics (Therapeutic Exercise) bilateral;gluteal sets;10 repetitions;2 sets  -     Row Name 02/23/23 0906          Knee (Therapeutic Exercise)    Knee (Therapeutic Exercise) PROM (passive range of motion);isometric exercises  -     Knee PROM (Therapeutic Exercise) 10 repetitions  2 sets  -     Knee Isometrics (Therapeutic Exercise) left;quad sets;10 repetitions;2 sets  -     Row Name 02/23/23 0906          Ankle (Therapeutic Exercise)    Ankle (Therapeutic Exercise) AROM (active range of motion)  -     Ankle AROM (Therapeutic Exercise) dorsiflexion;bilateral;plantarflexion;10 repetitions;2 sets  -     Row Name             Wound 02/21/23 0857 Right anterior knee Incision    Wound - Properties Group Placement Date: 02/21/23  -HK Placement Time: 0857  -HK Present on Hospital Admission: N  -HK Side: Right  -HK Orientation: anterior  -HK Location: knee  -HK Primary Wound Type: Incision  -HK    Retired Wound - Properties Group Placement Date: 02/21/23  -HK Placement Time: 0857  -HK Present on Hospital Admission: N  -HK Side: Right  -HK Orientation: anterior  -HK Location: knee  -HK Primary Wound Type: Incision  -HK    Retired Wound - Properties Group Date first assessed: 02/21/23  -HK Time first assessed: 0857 -HK  Present on Hospital Admission: N  -HK Side: Right  -HK Location: knee  -HK Primary Wound Type: Incision  -HK    Row Name 02/23/23 0906          Progress Summary (PT)    Progress Toward Functional Goals (PT) progress toward functional goals is good  -RH           User Key  (r) = Recorded By, (t) = Taken By, (c) = Cosigned By    Initials Name Provider Type     Betty Overton, RN Registered Nurse    RH Jame Ortega PTA Physical Therapist Assistant                Physical Therapy Education     Title: PT OT SLP Therapies (Done)     Topic: Physical Therapy (Done)     Point: Mobility training (Done)     Learning Progress Summary           Patient Acceptance, E,TB, VU by  at 2/23/2023 0133    Acceptance, E,D, DU by PG at 2/22/2023 0924    Acceptance, E,TB, VU by JEY at 2/21/2023 1142                   Point: Precautions (Done)     Learning Progress Summary           Patient Acceptance, E,TB, VU by  at 2/23/2023 0133    Acceptance, E,D, DU by PG at 2/22/2023 0924    Acceptance, E,TB, VU by JEY at 2/21/2023 1142                               User Key     Initials Effective Dates Name Provider Type Discipline     01/16/23 -  Rocio Stephens RN Registered Nurse Nurse    PG 06/16/21 -  [place]illy [place] OT Occupational Therapist OT    JEY 06/03/21 -  Cody Norton PT Physical Therapist PT              PT Recommendation and Plan     Progress Summary (PT)  Progress Toward Functional Goals (PT): progress toward functional goals is good   Outcome Measures     Row Name 02/23/23 0900 02/22/23 0900 02/21/23 1100       How much help from another person do you currently need...    Turning from your back to your side while in flat bed without using bedrails? 3  -RH 3  -RH 3  -JEY    Moving from lying on back to sitting on the side of a flat bed without bedrails? 3  -RH 3  -RH 3  -JEY    Moving to and from a bed to a chair (including a wheelchair)? 3  -RH 3  -RH 3  -JEY    Standing up from a chair using your arms (e.g., wheelchair,  bedside chair)? 3  -RH 3  -RH 3  -JEY    Climbing 3-5 steps with a railing? 4  -RH 4  -RH 3  -JEY    To walk in hospital room? 4  -RH 4  -RH 3  -JEY    AM-PAC 6 Clicks Score (PT) 20  -RH 20  -RH 18  -JEY       Functional Assessment    Outcome Measure Options -- -- AM-PAC 6 Clicks Basic Mobility (PT)  -JEY          User Key  (r) = Recorded By, (t) = Taken By, (c) = Cosigned By    Initials Name Provider Type     Jame Ortega PTA Physical Therapist Assistant    Cody Mcrae PT Physical Therapist                 Time Calculation:    PT Charges     Row Name 02/23/23 0905             Time Calculation    PT Received On 02/23/23  -RH         Timed Charges    75557 - PT Therapeutic Exercise Minutes 22  -RH      36061 - Gait Training Minutes  8  -RH      63446 - PT Therapeutic Activity Minutes 8  -RH         Total Minutes    Timed Charges Total Minutes 38  -RH       Total Minutes 38  -RH            User Key  (r) = Recorded By, (t) = Taken By, (c) = Cosigned By    Initials Name Provider Type     Jame Ortega PTA Physical Therapist Assistant              Therapy Charges for Today     Code Description Service Date Service Provider Modifiers Qty    54908993545 HC PT THER PROC EA 15 MIN 2/22/2023 Jame Ortega PTA GP 2    44474772741 HC GAIT TRAINING EA 15 MIN 2/22/2023 Jame Ortega PTA GP 1    59147158560 HC PT THER PROC EA 15 MIN 2/22/2023 Jame Ortega, PTA GP 1    48569381181 HC GAIT TRAINING EA 15 MIN 2/22/2023 Jame Ortega PTA GP 1    70772508310 HC PT THER PROC EA 15 MIN 2/23/2023 Jame Ortega PTA GP 1    63125530911 HC GAIT TRAINING EA 15 MIN 2/23/2023 Jame Ortega PTA GP 1    16346473140 HC PT THERAPEUTIC ACT EA 15 MIN 2/23/2023 Jame Ortega PTA GP 1          PT G-Codes  Outcome Measure Options: AM-PAC 6 Clicks Daily Activity (OT), Optimal Instrument  AM-PAC 6 Clicks Score (PT): 20  AM-PAC 6 Clicks Score (OT): 20    Jame Ortega PTA  2/23/2023

## 2023-02-23 NOTE — PLAN OF CARE
Goal Outcome Evaluation:  Plan of Care Reviewed With: patient           Outcome Evaluation: pt alert and able to make needs known. pt medicated twice for pain. pt up with one assist and rolling walker, walked more than a 110 feet-daniela well. pt voiding without difficulty.pt to d/c home with CPM and 3in1 bedside commode.

## 2023-02-23 NOTE — THERAPY TREATMENT NOTE
Patient Name: Herb Dimas  : 1950    MRN: [id number]                              Today's Date: 2023       Admit Date: 2023    Visit Dx:     ICD-10-CM ICD-9-CM   1. Difficulty in walking  R26.2 719.7   2. Osteoarthritis of right knee, unspecified osteoarthritis type  M17.11 715.96   3. Decreased activities of daily living (ADL)  Z78.9 V49.89     Patient Active Problem List   Diagnosis   • Vitamin D deficiency   • Hypertension, essential   • Elevated liver enzymes   • Mixed hyperlipidemia   • Deep vein thrombosis (DVT) of calf muscle vein of left lower extremity (HCC)   • Primary osteoarthritis of both knees   • Screening PSA (prostate specific antigen)   • IFG (impaired fasting glucose)   • Right calf pain   • Osteoarthrosis, localized, primary, knee, right   • PSA elevation     Past Medical History:   Diagnosis Date   • Arthritis    • Deep vein thrombosis (HCC)     Following knee surgery   • High blood pressure    • High cholesterol    • Right knee pain      Past Surgical History:   Procedure Laterality Date   • APPENDECTOMY     • BURSECTOMY Right     elbow   • CARDIAC CATHETERIZATION     • COLONOSCOPY     • REPLACEMENT TOTAL KNEE Left    • TOTAL KNEE ARTHROPLASTY Right 2023    Procedure: TOTAL KNEE ARTHROPLASTY WITH SHARATH ROBOT;  Surgeon: Malcolm Ceja MD;  Location: [facility];  Service: Robotics - Ortho;  Laterality: Right;      General Information     Row Name 23 0855          OT Time and Intention    Document Type therapy note (daily note)  -PG     Mode of Treatment individual therapy;occupational therapy  -PG           User Key  (r) = Recorded By, (t) = Taken By, (c) = Cosigned By    Initials Name Provider Type    PG Willy Park OT Occupational Therapist                 Mobility/ADL's     Row Name 23 0855          Transfers    Transfers bed-chair transfer  -PG     Row Name 23 0855          Bed-Chair Transfer    Bed-Chair [place]  (Transfers) standby assist;verbal cues  -PG     Assistive Device (Bed-Chair Transfers) walker, front-wheeled  -PG     Row Name 02/23/23 0855          Upper Body Dressing Assessment/Training    [place] Level (Upper Body Dressing) upper body dressing skills;set up  -PG     Row Name 02/23/23 0855          Lower Body Dressing Assessment/Training    [place] Level (Lower Body Dressing) lower body dressing skills;minimum assist (75% patient effort)  -PG     Row Name 02/23/23 0855          Grooming Assessment/Training    [place] Level (Grooming) grooming skills;standby assist;verbal cues  -PG     Position (Grooming) supported standing  -PG     Row Name 02/23/23 0855          Toileting Assessment/Training    [place] Level (Toileting) toileting skills;standby assist;contact guard assist  -PG           User Key  (r) = Recorded By, (t) = Taken By, (c) = Cosigned By    Initials Name Provider Type    PG Willy Park, DAMARIS Occupational Therapist               Obj/Interventions    No documentation.                Goals/Plan    No documentation.                Clinical Impression     Row Name 02/23/23 0855          Plan of Care Review    Progress improving  -PG           User Key  (r) = Recorded By, (t) = Taken By, (c) = Cosigned By    Initials Name Provider Type    PG Willy Park, OT Occupational Therapist               Outcome Measures     Row Name 02/23/23 0855          How much help from another is currently needed...    Putting on and taking off regular lower body clothing? 3  -PG     Bathing (including washing, rinsing, and drying) 3  -PG     Toileting (which includes using toilet bed pan or urinal) 3  -PG     Putting on and taking off regular upper body clothing 3  -PG     Taking care of personal grooming (such as brushing teeth) 4  -PG     Eating meals 4  -PG     AM-PAC 6 Clicks Score (OT) 20  -PG     Row Name 02/23/23 0720          How much help from another person do you currently need...    Turning  from your back to your side while in flat bed without using bedrails? 3  -DB     Moving from lying on back to sitting on the side of a flat bed without bedrails? 3  -DB     Moving to and from a bed to a chair (including a wheelchair)? 3  -DB     Standing up from a chair using your arms (e.g., wheelchair, bedside chair)? 3  -DB     Climbing 3-5 steps with a railing? 3  -DB     To walk in hospital room? 3  -DB     AM-PAC 6 Clicks Score (PT) 18  -DB     Highest level of mobility 6 --> Walked 10 steps or more  -DB     Row Name 02/23/23 0855          Functional Assessment    Outcome Measure Options AM-PAC 6 Clicks Daily Activity (OT);Optimal Instrument  -PG     Row Name 02/23/23 0855          Optimal Instrument    Optimal Instrument Optimal - 3  -PG     Standing 1  -PG     Reaching 1  -PG           User Key  (r) = Recorded By, (t) = Taken By, (c) = Cosigned By    Initials Name Provider Type    PG Willy Park, DAMARIS Occupational Therapist    DB Kalpana Coleman, RN Registered Nurse                Occupational Therapy Education     Title: PT OT SLP Therapies (Done)     Topic: Occupational Therapy (Done)     Point: ADL training (Done)     Description:   Instruct learner(s) on proper safety adaptation and remediation techniques during self care or transfers.   Instruct in proper use of assistive devices.              Learning Progress Summary           Patient Acceptance, E,TB, VU by RK at 2/23/2023 0133    Acceptance, E,D, DU by PG at 2/22/2023 0924                   Point: Home exercise program (Done)     Description:   Instruct learner(s) on appropriate technique for monitoring, assisting and/or progressing therapeutic exercises/activities.              Learning Progress Summary           Patient Acceptance, E,TB, VU by RK at 2/23/2023 0133    Acceptance, E,D, DU by PG at 2/22/2023 0924                   Point: Precautions (Done)     Description:   Instruct learner(s) on prescribed precautions during self-care and  functional transfers.              Learning Progress Summary           Patient Acceptance, E,TB, VU by RK at 2/23/2023 0133    Acceptance, E,D, DU by PG at 2/22/2023 0924                   Point: Body mechanics (Done)     Description:   Instruct learner(s) on proper positioning and spine alignment during self-care, functional mobility activities and/or exercises.              Learning Progress Summary           Patient Acceptance, E,TB, VU by RK at 2/23/2023 0133    Acceptance, E,D, DU by PG at 2/22/2023 0924                               User Key     Initials Effective Dates Name Provider Type Discipline    RK 01/16/23 -  Rocio Stephens, RN Registered Nurse Nurse    PG 06/16/21 -  Willy Park OT Occupational Therapist OT              OT Recommendation and Plan  Planned Therapy Interventions (OT): BADL retraining, occupation/activity based interventions, patient/caregiver education/training, transfer/mobility retraining  Therapy Frequency (OT): 5 times/wk  Plan of Care Review  Plan of Care Reviewed With: patient  Progress: improving  Outcome Evaluation: Patient presents with limitations affecting strength, activity tolerance, and balance impacting patient's ability to return home safely and independently.  The skills of a therapist will be required to safely and effectively implement the following treatment plan to restore maximal level of function     Time Calculation:    Time Calculation- OT     Row Name 02/23/23 0856             Time Calculation- OT    OT Received On 02/23/23  -PG      OT Goal Re-Cert Due Date 03/03/23  -PG         Timed Charges    25076 - OT Therapeutic Activity Minutes 10  -PG      35324 - OT Self Care/Mgmt Minutes 15  -PG         Total Minutes    Timed Charges Total Minutes 25  -PG       Total Minutes 25  -PG            User Key  (r) = Recorded By, (t) = Taken By, (c) = Cosigned By    Initials Name Provider Type    PG Willy Park OT Occupational Therapist              Therapy Charges  for Today     Code Description Service Date Service Provider Modifiers Qty    36585804681 HC OT THERAPEUTIC ACT EA 15 MIN 2/22/2023 Willy Park OT GO 1    54732064841 HC OT SELF CARE/MGMT/TRAIN EA 15 MIN 2/22/2023 Willy Park OT GO 1    32993338460 HC OT EVAL LOW COMPLEXITY 2 2/22/2023 Willy Park OT GO 1    66600674114 HC OT THERAPEUTIC ACT EA 15 MIN 2/23/2023 Willy Park OT GO 1    06871363590 HC OT SELF CARE/MGMT/TRAIN EA 15 MIN 2/23/2023 Willy Park OT GO 1               Willy Park OT  2/23/2023

## 2023-02-24 NOTE — OP NOTE
TOTAL KNEE ARTHROPLASTY WITH SHARATH ROBOT  Procedure Report    Patient Name:  Herb Dimas  YOB: 1950    Date of Surgery:  2/21/2023       Pre-op Diagnosis:   Osteoarthritis of right knee, unspecified osteoarthritis type [M17.11]       Post-Op Diagnosis Codes:     * Osteoarthritis of right knee, unspecified osteoarthritis type [M17.11]    Procedure/CPT® Codes:      Procedure(s):  Right TOTAL KNEE ARTHROPLASTY WITH SHARATH ROBOT    Surgical Approach: Knee Medial Parapatellar        Staff:  Surgeon(s):  Malcolm Ceja MD    Assistant: Ashley Ibrahim; Ronda Manuel    Anesthesia: General    Estimated Blood Loss: 50ml    Implants:    Implant Name Type Inv. Item Serial No.  Lot No. LRB No. Used Action   CMT BONE PALACOS R HI/VISC 1X40 - [id number] Implant CMT BONE PALACOS R HI/VISC 1X40  [facility] [id number] Right 2 Implanted   PAT KN PERSONA ALLPOLY CMT 35MM - [id number] Implant PAT KN PERSONA ALLPOLY CMT 35MM  [employer] [id number] Right 1 Implanted   COMP FEM/KN PERSONA CR CMT COCR STD SZ10 RT - [id number] Implant COMP FEM/KN PERSONA CR CMT COCR STD SZ10 RT  [employer] [id number] Right 1 Implanted   STEM TIB/KN PERSONA CMT 5D SZG RT - [id number] Implant STEM TIB/KN PERSONA CMT 5D SZG RT  [employer] [id number] Right 1 Implanted   ART/SRF KN PERSONA/VE PS GH/CR 8TO11 13MM RT - [id number] Implant ART/SRF KN PERSONA/VE PS GH/CR 8TO11 13MM RT  [employer] [id number] Right 1 Implanted   SUT FW #2 W/TPR NDL 1/2 CIR 38IN 97CM 26.5MM JOSÉ - [id number] Implant SUT FW #2 W/TPR NDL 1/2 CIR 38IN 97CM 26.5MM JOSÉ  [employer] [id number] Right 4 Implanted   CAP TOTL KN CMT PRIMARY W/SHARATH - [id number] Implant CAP TOTL KN CMT PRIMARY W/SHARATH  [employer]  Right 1 Implanted       Specimen:          None    Findings: See description    Complications: None    Description of Procedure: Patient was taken to the operating room placed supine operating table after abductor canal blocks and in preoperative holding.   After general tracheal anesthesia was established the right knee and lower extremity were prepped and draped in standard surgical fashion using alcohol ChloraPrep.  The Marguerite robot was also draped sterilely and calibrated.  Incision was made 2 fingerbreadth superior superior pole of the patella down to the medial aspect of tibial tubercle the knife and full-thickness skin flaps were raised laterally and medially.  A medial parapatellar arthrotomy was then undertaken and the knee was brought into flexion.  Retractors were placed to protect protect the collateral ligaments.  Soft tissue releases and osteophytes removed as deemed necessary.  Then the tracking device was mounted on the distal femur in a standard fashion as well as through separate stab incisions in the distal tibia 5 fingerbreadths inferior to the tibial tubercle.  Then all the femoral points were mapped out using the Marguerite software the tibial points were mapped out as well.  While the plan for resection was being completed the patella was everted calipered and cut to the appropriate thickness.  The correct size patella was chosen in the locals for the patella were reamed and a trial patella was placed and the knee was brought back into flexion.  The distal femoral cutting block was then brought in using robotic assisted arm and the distal femoral cut was made it was checked and verified and accepted.  Then the external rotation arm of the robot was used to pin the distal femur and the correct external rotation decided by the intraoperative plan using the previously mapped points.  Then the tibia was cut after removing the robotic arm and to the appropriate position to cut the tibia the cutting block was pinned and the proximal tibia cut was made excess bone from the cut was removed and the 4-in-1 cutting block was then used in the distal femur.  The tibial cut was verified and accepted femoral and tibial trials were then placed and the knee was taken  through range of motion verifying flexion extension gaps and extension and flexion range of motion to be acceptable by using the software in a standard fashion.  Locals for the femur were then drilled the trials were removed the bone ends were copiously irrigated with bacitracin Simpulse irrigation.  The tibia was cemented in place according to marked external rotation from the trials the femur was cemented in position second and then the real polychosen was placed.  Excess cement removed from the implant edges the knee was held in extension until the cement dried and the patella was cemented into place and held with a patellar clamp.  After the cement hardened excess management from the implant edges Irrisept was used and wound was copiously irrigated the knee was taken through range of motion and checked 1 last time the patella tracked in the center of the trochlear groove the femur using no thumbs technique.  Then the arthrotomy was closed in 45 degrees of flexion with Ethibond the deep fat was closed 0 Vicryl subcu was closed with 2-0 Vicryl and the skin was closed with staples incision was washed and dried and sterile dressings were applied the patient tolerated the procedure well and was taken to recovery room.       Malcolm Ceja MD     Date: 2/24/2023  Time: 12:52 EST

## 2023-03-02 ENCOUNTER — TELEPHONE (OUTPATIENT)
Dept: ORTHOPEDIC SURGERY | Facility: CLINIC | Age: 73
End: 2023-03-02
Payer: MEDICARE

## 2023-03-02 DIAGNOSIS — Z96.651 STATUS POST TOTAL KNEE REPLACEMENT, RIGHT: ICD-10-CM

## 2023-03-03 ENCOUNTER — PREP FOR SURGERY (OUTPATIENT)
Dept: OTHER | Facility: HOSPITAL | Age: 73
End: 2023-03-03
Payer: MEDICARE

## 2023-03-03 RX ORDER — HYDROCODONE BITARTRATE AND ACETAMINOPHEN 7.5; 325 MG/1; MG/1
1 TABLET ORAL EVERY 4 HOURS PRN
Qty: 42 TABLET | Refills: 0 | Status: SHIPPED | OUTPATIENT
Start: 2023-03-03 | End: 2023-03-22

## 2023-03-06 ENCOUNTER — OFFICE VISIT (OUTPATIENT)
Dept: ORTHOPEDIC SURGERY | Facility: CLINIC | Age: 73
End: 2023-03-06
Payer: MEDICARE

## 2023-03-06 VITALS — BODY MASS INDEX: 33.88 KG/M2 | HEIGHT: 71 IN | WEIGHT: 242 LBS

## 2023-03-06 DIAGNOSIS — Z96.651 STATUS POST TOTAL KNEE REPLACEMENT, RIGHT: Primary | ICD-10-CM

## 2023-03-06 DIAGNOSIS — Z47.1 AFTERCARE FOLLOWING RIGHT KNEE JOINT REPLACEMENT SURGERY: ICD-10-CM

## 2023-03-06 DIAGNOSIS — Z96.651 AFTERCARE FOLLOWING RIGHT KNEE JOINT REPLACEMENT SURGERY: ICD-10-CM

## 2023-03-06 PROCEDURE — 99024 POSTOP FOLLOW-UP VISIT: CPT | Performed by: ORTHOPAEDIC SURGERY

## 2023-03-06 RX ORDER — ONDANSETRON 4 MG/1
4 TABLET, FILM COATED ORAL EVERY 8 HOURS PRN
Qty: 25 TABLET | Refills: 0 | Status: SHIPPED | OUTPATIENT
Start: 2023-03-06

## 2023-03-06 NOTE — PROGRESS NOTES
"Chief Complaint  Follow-up of the Right Knee     Subjective      Herb Dimas presents to [facility] for follow up of the right total knee arthroplasty with zaid lizama on 23. He had his staple removed today.  He is on a CPM machine 60 with knee flexion He is a knee immobilizer for a partial tear of quad tendon intraoperative.     No Known Allergies     Social History     Socioeconomic History   • Marital status:    Tobacco Use   • Smoking status: Former     Packs/day: 0.50     Years: 5.00     Pack years: 2.50     Types: Cigarettes     Start date: 1970     Quit date: 1976     Years since quittin.3   • Smokeless tobacco: Never   Vaping Use   • Vaping Use: Never used   Substance and Sexual Activity   • Alcohol use: Yes     Alcohol/week: 4.0 standard drinks     Types: 4 Cans of beer per week     Comment: Occasionally   • Drug use: Never   • Sexual activity: Not Currently     Partners: Female        Review of Systems     Objective   Vital Signs:   Ht 180.3 cm (71\")   Wt 110 kg (242 lb)   BMI 33.75 kg/m²       Physical Exam  Constitutional:       Appearance: Normal appearance. Patient is well-developed and normal weight.   HENT:      Head: Normocephalic.      Right Ear: Hearing and external ear normal.      Left Ear: Hearing and external ear normal.      Nose: Nose normal.   Eyes:      Conjunctiva/sclera: Conjunctivae normal.   Cardiovascular:      Rate and Rhythm: Normal rate.   Pulmonary:      Effort: Pulmonary effort is normal.      Breath sounds: No wheezing or rales.   Abdominal:      Palpations: Abdomen is soft.      Tenderness: There is no abdominal tenderness.   Musculoskeletal:      Cervical back: Normal range of motion.   Skin:     Findings: No rash.   Neurological:      Mental Status: Patient  is alert and oriented to person, place, and time.   Psychiatric:         Mood and Affect: Mood and affect normal.         Judgment: Judgment normal.       Ortho " Exam      RIGHT KNEE staples removed.  No evidence of wound dehiscence, surrounding erythema, warmth, or drainage. Flexion 70. Extension -5. Stable to varus/valgus stress. Stable to anterior/posterior drawer. Neurovascularly intact Positive EHL, FHL, HS and TA. Sensation intact to light touch all 5 nerves of the foot. Ambulates with Antalgic gait. Patella is well tracking. Calf supple,Good strength to hamstrings, quadriceps, dorsiflexors, and plantar flexors.  Knee Extensor Mechanism intact. He has a 20 second lag with straight leg raise.           Procedures        Imaging Results (Most Recent)     Procedure Component Value Units Date/Time    XR Knee 3 View Right [[id number]] Resulted: 03/06/23 0929     Updated: 03/06/23 0949           Result Review :     X-Ray Report:  Right knee X-Ray  Indication: Evaluation of the right knee.   AP/Lateral and Elbert view(s)  Findings: Intact right knee replacement. No signs of loosening, subsidence or periprosthetic fracture.   Prior studies available for comparison: Yes            Assessment and Plan     Diagnoses and all orders for this visit:    1. Status post total knee replacement, right (Primary)  -     XR Knee 3 View Right  -     ondansetron (Zofran) 4 MG tablet; Take 1 tablet by mouth Every 8 (Eight) Hours As Needed for Nausea or Vomiting.  Dispense: 25 tablet; Refill: 0    2. Aftercare following right knee joint replacement surgery        Discussed the treatment plan with the patient. I reviewed the X-rays that were obtained today with the patient. increase CPM ROM to 90  Knee immobilizer for ambulation.  Wait on active extension. Prescribed zolfran.    Call or return if worsening symptoms.    Follow Up      2 weeks.       Patient was given instructions and counseling regarding his condition or for health maintenance advice. Please see specific information pulled into the AVS if appropriate.     Scribed for Malcolm Ceja MD by Odalis Hurtado MA.  03/06/23   09:55  EST    I have personally performed the services described in this document as scribed by the above individual and it is both accurate and complete. Malcolm Ceja MD 03/08/23

## 2023-03-22 ENCOUNTER — OFFICE VISIT (OUTPATIENT)
Dept: ORTHOPEDIC SURGERY | Facility: CLINIC | Age: 73
End: 2023-03-22
Payer: MEDICARE

## 2023-03-22 VITALS — HEART RATE: 84 BPM | WEIGHT: 231.8 LBS | BODY MASS INDEX: 31.4 KG/M2 | OXYGEN SATURATION: 98 % | HEIGHT: 72 IN

## 2023-03-22 DIAGNOSIS — Z47.1 AFTERCARE FOLLOWING RIGHT KNEE JOINT REPLACEMENT SURGERY: ICD-10-CM

## 2023-03-22 DIAGNOSIS — Z96.651 STATUS POST TOTAL KNEE REPLACEMENT, RIGHT: Primary | ICD-10-CM

## 2023-03-22 DIAGNOSIS — Z96.651 AFTERCARE FOLLOWING RIGHT KNEE JOINT REPLACEMENT SURGERY: ICD-10-CM

## 2023-03-22 PROCEDURE — 99024 POSTOP FOLLOW-UP VISIT: CPT | Performed by: ORTHOPAEDIC SURGERY

## 2023-03-22 RX ORDER — HYDROCODONE BITARTRATE AND ACETAMINOPHEN 5; 325 MG/1; MG/1
1 TABLET ORAL EVERY 4 HOURS PRN
Qty: 40 TABLET | Refills: 0 | Status: SHIPPED | OUTPATIENT
Start: 2023-03-22

## 2023-03-22 NOTE — PROGRESS NOTES
"Chief Complaint  Follow-up of the Right Knee     Subjective      Herb Dimas presents to [facility]   for follow up of the right total knee arthroplasty with zaid lizama on 23. He had his staple removed today.  He is on a CPM machine 90  with knee flexion He is a knee immobilizer for a partial tear of quad tendon intraoperative. He has  physical therapy at this time. He doesn't use a walker at home most of the time.     No Known Allergies     Social History     Socioeconomic History   • Marital status:    Tobacco Use   • Smoking status: Former     Packs/day: 0.50     Years: 5.00     Pack years: 2.50     Types: Cigarettes     Start date: 1970     Quit date: 1976     Years since quittin.3   • Smokeless tobacco: Never   Vaping Use   • Vaping Use: Never used   Substance and Sexual Activity   • Alcohol use: Yes     Alcohol/week: 4.0 standard drinks     Types: 4 Cans of beer per week     Comment: Occasionally   • Drug use: Never   • Sexual activity: Not Currently     Partners: Female        Review of Systems     Objective   Vital Signs:   Pulse 84   Ht 182.9 cm (72\")   Wt 105 kg (231 lb 12.8 oz)   SpO2 98%   BMI 31.44 kg/m²       Physical Exam  Constitutional:       Appearance: Normal appearance. Patient is well-developed and normal weight.   HENT:      Head: Normocephalic.      Right Ear: Hearing and external ear normal.      Left Ear: Hearing and external ear normal.      Nose: Nose normal.   Eyes:      Conjunctiva/sclera: Conjunctivae normal.   Cardiovascular:      Rate and Rhythm: Normal rate.   Pulmonary:      Effort: Pulmonary effort is normal.      Breath sounds: No wheezing or rales.   Abdominal:      Palpations: Abdomen is soft.      Tenderness: There is no abdominal tenderness.   Musculoskeletal:      Cervical back: Normal range of motion.   Skin:     Findings: No rash.   Neurological:      Mental Status: Patient  is alert and oriented to person, " place, and time.   Psychiatric:         Mood and Affect: Mood and affect normal.         Judgment: Judgment normal.       Ortho Exam      RIGHT KNEE  No evidence of wound dehiscence, surrounding erythema, warmth, or drainage. Flexion 70 with physical therapy.  The CPM is set 90. Extension -5. Stable to varus/valgus stress. Stable to anterior/posterior drawer. Neurovascularly intact Positive EHL, FHL, HS and TA. Sensation intact to light touch all 5 nerves of the foot. Ambulates with Antalgic gait. Patella is well tracking. Calf supple,Good strength to hamstrings, quadriceps, dorsiflexors, and plantar flexors.  Knee Extensor Mechanism intact. He can do a straight leg raise.       Procedures        Imaging Results (Most Recent)     None           Result Review :                  Assessment and Plan     Diagnoses and all orders for this visit:    1. Status post total knee replacement, right (Primary)    2. Aftercare following right knee joint replacement surgery        Discussed the treatment plan with the patient. ROM needs to be    then no manipulation. Prescribed outpatient therapy. Brace with walking. Discharge ALTON hose.     Call or return if worsening symptoms.    Follow Up     2 weeks X ray.       Patient was given instructions and counseling regarding his condition or for health maintenance advice. Please see specific information pulled into the AVS if appropriate.     Scribed for Malcolm Ceja MD by Odalis Hurtado MA.  03/22/23   09:22 EDT    I have personally performed the services described in this document as scribed by the above individual and it is both accurate and complete. Malcolm Ceja MD 03/22/23

## 2023-03-29 ENCOUNTER — TELEPHONE (OUTPATIENT)
Dept: ORTHOPEDIC SURGERY | Facility: CLINIC | Age: 73
End: 2023-03-29
Payer: MEDICARE

## 2023-03-29 NOTE — TELEPHONE ENCOUNTER
RECEIVED CALL FROM CASEY AT [facility] IN [facility], PATIENT WAS SEEN TODAY IN THEIR OFFICE BY JESUS MORALES PT. PER JESUS MORALES PATIENT NEEDED TO STOP BY OUR OFFICE FOR AN INCISION CHECK DUE TO REDNESS AND SLIGHT WARMTH.   PATIENT'S INCISION EVALUATED BY DR. ATWOOD. PER DR. ATWOOD THERE IS NO NEED FOR ANTIBIOTICS AT THIS TIME. HE ADVISED PATIENT TO SEND A PICTURE OF HIS INCISION TO [facility] OR MYSELF ON Friday FOR REVIEW. WILL ADVISE ON ANY FURTHER ORDERS ON Friday.

## 2023-03-31 ENCOUNTER — TELEPHONE (OUTPATIENT)
Dept: ORTHOPEDIC SURGERY | Facility: CLINIC | Age: 73
End: 2023-03-31
Payer: MEDICARE

## 2023-03-31 NOTE — TELEPHONE ENCOUNTER
PATIENT'S WIFE WAS CALLED AND ADVISED PER DR ATWOOD THAT HE FEELS NO INFECTION NOTED.  PATIENT AND WIFE ARE TO CONTINUE TO OBSERVE AND CALL IF ANY ISSUES ARISE.

## 2023-04-10 ENCOUNTER — TELEPHONE (OUTPATIENT)
Dept: ORTHOPEDIC SURGERY | Facility: CLINIC | Age: 73
End: 2023-04-10

## 2023-04-10 ENCOUNTER — OFFICE VISIT (OUTPATIENT)
Dept: ORTHOPEDIC SURGERY | Facility: CLINIC | Age: 73
End: 2023-04-10
Payer: MEDICARE

## 2023-04-10 VITALS — HEIGHT: 72 IN | HEART RATE: 105 BPM | WEIGHT: 232.8 LBS | OXYGEN SATURATION: 96 % | BODY MASS INDEX: 31.53 KG/M2

## 2023-04-10 DIAGNOSIS — Z96.651 S/P TKR (TOTAL KNEE REPLACEMENT), RIGHT: Primary | ICD-10-CM

## 2023-04-10 DIAGNOSIS — Z96.651 S/P TKR (TOTAL KNEE REPLACEMENT), RIGHT: ICD-10-CM

## 2023-04-10 DIAGNOSIS — M25.561 RIGHT KNEE PAIN, UNSPECIFIED CHRONICITY: Primary | ICD-10-CM

## 2023-04-10 PROCEDURE — 1160F RVW MEDS BY RX/DR IN RCRD: CPT

## 2023-04-10 PROCEDURE — 99024 POSTOP FOLLOW-UP VISIT: CPT

## 2023-04-10 PROCEDURE — 1159F MED LIST DOCD IN RCRD: CPT

## 2023-04-10 RX ORDER — CEPHALEXIN 500 MG/1
500 CAPSULE ORAL EVERY 6 HOURS
Qty: 40 CAPSULE | Refills: 0 | Status: SHIPPED | OUTPATIENT
Start: 2023-04-10

## 2023-04-10 RX ORDER — CEPHALEXIN 500 MG/1
500 CAPSULE ORAL EVERY 6 HOURS
Qty: 120 CAPSULE | Refills: 0 | Status: SHIPPED | OUTPATIENT
Start: 2023-04-10 | End: 2023-04-10 | Stop reason: SDUPTHER

## 2023-04-10 NOTE — TELEPHONE ENCOUNTER
Caller:TERRY DUPREE    Relationship to patient: SELF     Best call back number: [phone]    Patient is needing: A PRESCRIPTION WAS SUPPOSED TO HAVE BEEN CALLED IN TODAY AND THE PHARMACY IS STATING THAT THEY DON'T HAVE IT PLEASE RESEND AND REACH OUT TO PATIENT

## 2023-04-10 NOTE — TELEPHONE ENCOUNTER
Script was sent into the wrong pharmacy. Sent antibiotics to [facility] on [address].    [address]

## 2023-04-10 NOTE — PATIENT INSTRUCTIONS
Patient is doing very well. Advised to continue PT to completion to progress strength and ROM. Continue home exercises.     Keflex sent to the pharmacy.    Previous CBCs reviewed today with concern for chronic anemia.  Encouraged to continue iron supplement for increased fatigue and history of anemia.     Continue icing knee as needed up to 4 times daily for no longer than 15-20 mins at a time.     Follow-up in 6 weeks. Repeat x-rays needed. If pt continues to complain of fatigue consider CBC. Call with changes or concerns.

## 2023-04-10 NOTE — PROGRESS NOTES
"Chief Complaint  Follow-up of the Right Knee    Subjective      Herb Dimas presents to [facility] for follow-up of right total knee arthroplasty with partial quad tendon tear patient states that he has been in a immobilizer for 4 weeks and has been released from that patient is doing well.  He attends physical therapy 3 times per week with [facility] in [place].  Reports that his range of motion with PT is -3 degrees to 105 degrees.  Reports pain is a 5 out of 10 with activities but has virtually no pain without activities.  He experienced some stiffness due to the immobility from being in the immobilizer for 4 weeks but this is improving.  He reports that the physical therapist he has been working with expressed some concern about the redness over his incision which he presented to the office to be evaluated and was not sent any medications at that time.  He and his wife continue to express concern regarding the redness and warmth over his incision.  His wife reports that he has been experiencing extreme fatigue and tiredness since his surgery.  She is concerned that he may be anemic and inquires about a CBC.  States he began taking iron supplements 3 days ago as recommended by his PCP.    Objective   No Known Allergies    Vital Signs:   Pulse 105   Ht 182.9 cm (72\")   Wt 106 kg (232 lb 12.8 oz)   SpO2 96%   BMI 31.57 kg/m²       Physical Exam    Constitutional: Awake, alert. Well nourished appearance.    Integumentary: Warm, dry, intact. No obvious rashes.    HENT: Atraumatic, normocephalic.   Respiratory: Non labored respirations .   Cardiovascular: Intact peripheral pulses.    Psychiatric: Normal mood and affect. A&O X3    Ortho Exam   The incision is well approximated with minimal scab formation.  There is redness, tenderness, and warmth over the incision.  There is no drainage.  His range of motion is -3 extension to 105 degrees flexion.  No pain with range of " motion.    Imaging Results (Most Recent)     Procedure Component Value Units Date/Time    XR Knee 3 View Right [[id number]] Resulted: 04/10/23 1245     Updated: 04/10/23 1246    Narrative:      X-Ray Report:  Study: X-rays ordered, taken in the office, and reviewed today.   Site: Right knee xray  Indication: Right total knee arthroplasty  View: AP/Lateral view(s)  Findings: Intact right knee arthroplasty. No evidence of hardware   malfunction.   Prior studies available for comparison: yes                       Assessment and Plan   Problem List Items Addressed This Visit    None  Visit Diagnoses     Right knee pain, unspecified chronicity    -  Primary    Relevant Orders    XR Knee 3 View Right (Completed)    Ambulatory Referral to Physical Therapy Evaluate and treat; Stretching, Strengthening, ROM (Completed)    S/P TKR (total knee replacement), right        Relevant Orders    Ambulatory Referral to Physical Therapy Evaluate and treat; Stretching, Strengthening, ROM (Completed)          Follow Up   Return in about 6 weeks (around 5/22/2023).  Patient is a former smoker.  Did not discuss options for smoking cessation.     Educated on risk of elevated BMI. Encouraged to follow up with PCP for weight loss options.       Patient Instructions   Patient is doing very well. Advised to continue PT to completion to progress strength and ROM. Continue home exercises.     Keflex sent to the pharmacy.    Previous CBCs reviewed today with concern for chronic anemia.  Encouraged to continue iron supplement for increased fatigue and history of anemia.     Continue icing knee as needed up to 4 times daily for no longer than 15-20 mins at a time.     Follow-up in 6 weeks. Repeat x-rays needed. If pt continues to complain of fatigue consider CBC. Call with changes or concerns.     Patient was given instructions and counseling regarding his condition or for health maintenance advice. Please see specific information pulled into the AVS  if appropriate.

## 2023-05-15 ENCOUNTER — TELEPHONE (OUTPATIENT)
Dept: ORTHOPEDIC SURGERY | Facility: CLINIC | Age: 73
End: 2023-05-15
Payer: MEDICARE

## 2023-05-15 DIAGNOSIS — Z96.651 AFTERCARE FOLLOWING RIGHT KNEE JOINT REPLACEMENT SURGERY: ICD-10-CM

## 2023-05-15 DIAGNOSIS — Z96.651 STATUS POST TOTAL KNEE REPLACEMENT, RIGHT: ICD-10-CM

## 2023-05-15 DIAGNOSIS — Z47.1 AFTERCARE FOLLOWING RIGHT KNEE JOINT REPLACEMENT SURGERY: ICD-10-CM

## 2023-05-15 RX ORDER — HYDROCODONE BITARTRATE AND ACETAMINOPHEN 5; 325 MG/1; MG/1
1 TABLET ORAL EVERY 6 HOURS PRN
Qty: 28 TABLET | Refills: 0 | Status: SHIPPED | OUTPATIENT
Start: 2023-05-15

## 2023-05-18 RX ORDER — ROSUVASTATIN CALCIUM 20 MG/1
TABLET, COATED ORAL
Qty: 90 TABLET | Refills: 3 | Status: SHIPPED | OUTPATIENT
Start: 2023-05-18

## 2023-05-18 RX ORDER — IRBESARTAN 300 MG/1
TABLET ORAL
Qty: 90 TABLET | Refills: 3 | Status: SHIPPED | OUTPATIENT
Start: 2023-05-18

## 2023-05-22 ENCOUNTER — OFFICE VISIT (OUTPATIENT)
Dept: ORTHOPEDIC SURGERY | Facility: CLINIC | Age: 73
End: 2023-05-22
Payer: MEDICARE

## 2023-05-22 VITALS — OXYGEN SATURATION: 97 % | BODY MASS INDEX: 31.42 KG/M2 | HEART RATE: 66 BPM | WEIGHT: 232 LBS | HEIGHT: 72 IN

## 2023-05-22 DIAGNOSIS — Z96.651 S/P TOTAL KNEE ARTHROPLASTY, RIGHT: ICD-10-CM

## 2023-05-22 DIAGNOSIS — M25.561 RIGHT KNEE PAIN, UNSPECIFIED CHRONICITY: Primary | ICD-10-CM

## 2023-05-22 NOTE — PATIENT INSTRUCTIONS
X-rays taken and reviewed with patient.  Hardware is intact.       Patient is doing well.  Encouraged to continue home exercises for ROM and strengthening. May continue icing as needed for no more than 20 minutes at a time for comfort and inflammation.     May apply Vitamin E, cocoa butter, etc. over incision to aid in scar appearance.     Encouraged to continue avoiding kneeling directly on prosthetic and avoid deep squatting.    Educated about needing dental prophylaxis for life.  Pt is to call our office or the dentist to receive an order for antibiotics prior to dental procedure.    Educated that numbness can improve for up to 1 year, however at the year wanda if still experiencing numbness it may not return.     Follow-up in 9 months for updated x-rays at the 1 year anniversary. Call with questions or concerns.

## 2023-05-22 NOTE — PROGRESS NOTES
"Chief Complaint  Follow-up of the Right Knee    Subjective      Herb Dimas presents to [facility] for 3-month follow-up of right total knee arthroplasty with Marguerite lizama with Dr. Ceja on 2/21/2023.  States that overall he has been pleased with his knee and has had very little pain.  He is continuing physical therapy with [facility].  He is scheduled to June 1, however he may go twice a week to stretch out his visits longer.  He reports that now his lower back is giving him trouble.  Reports that he had the same problem with his left knee replacement which was corrected with a wedge in his shoe.  He currently has a wedge in his right shoe to hopefully help with the back pain.    Objective   No Known Allergies    Vital Signs:   Pulse 66   Ht 182.9 cm (72\")   Wt 105 kg (232 lb)   SpO2 97%   BMI 31.46 kg/m²       Physical Exam    Constitutional: Awake, alert. Well nourished appearance.    Integumentary: Warm, dry, intact. No obvious rashes.    HENT: Atraumatic, normocephalic.   Respiratory: Non labored respirations .   Cardiovascular: Intact peripheral pulses.    Psychiatric: Normal mood and affect. A&O X3    Ortho Exam  Right knee: Incision is well approximated and completely healed.  There is no redness, drainage or tenderness to the touch.  Stable to varus and valgus stress.  Negative drawer test.  Range of motion from 0 to 150 degrees.  Neurovascularly intact with peripheral pulses present.  Sensation is intact.    Imaging Results (Most Recent)     Procedure Component Value Units Date/Time    XR Knee 3 View Right [[id number]] Resulted: 05/22/23 1121     Updated: 05/22/23 1122    Narrative:      X-Ray Report:  Study: X-rays ordered, taken in the office, and reviewed today.   Site: Right knee xray  Indication: Right TKA  View: AP/Lateral, Standing and Sunrise view(s)  Findings: Intact right total knee arthroplasty. No evidence of hardware   malfunction or periprosthetic fractures.  Patella " is centrally tracking.  Prior studies available for comparison: yes                       Assessment and Plan   Problem List Items Addressed This Visit    None  Visit Diagnoses     Right knee pain, unspecified chronicity    -  Primary    Relevant Orders    XR Knee 3 View Right (Completed)    S/P total knee arthroplasty, right              Follow Up   No follow-ups on file.    Patient is a non-smoker, did not discuss options for smoking cessation.     Social History     Socioeconomic History   • Marital status:    Tobacco Use   • Smoking status: Former     Packs/day: 0.50     Years: 5.00     Pack years: 2.50     Types: Cigarettes     Start date: 1970     Quit date: 1976     Years since quittin.5   • Smokeless tobacco: Never   Vaping Use   • Vaping Use: Never used   Substance and Sexual Activity   • Alcohol use: Yes     Alcohol/week: 4.0 standard drinks     Types: 4 Cans of beer per week     Comment: Occasionally   • Drug use: Never   • Sexual activity: Not Currently     Partners: Female       Patient Instructions   X-rays taken and reviewed with patient.  Hardware is intact.       Patient is doing well.  Encouraged to continue home exercises for ROM and strengthening. May continue icing as needed for no more than 20 minutes at a time for comfort and inflammation.     May apply Vitamin E, cocoa butter, etc. over incision to aid in scar appearance.     Encouraged to continue avoiding kneeling directly on prosthetic and avoid deep squatting.    Educated about needing dental prophylaxis for life.  Pt is to call our office or the dentist to receive an order for antibiotics prior to dental procedure.    Educated that numbness can improve for up to 1 year, however at the year wanda if still experiencing numbness it may not return.     Follow-up in 9 months for updated x-rays at the 1 year anniversary. Call with questions or concerns.      Patient was given instructions and counseling regarding his  condition or for health maintenance advice. Please see specific information pulled into the AVS if appropriate.

## 2023-08-10 RX ORDER — CHLORTHALIDONE 25 MG/1
TABLET ORAL
Qty: 90 TABLET | Refills: 3 | Status: SHIPPED | OUTPATIENT
Start: 2023-08-10

## 2023-08-10 RX ORDER — AMLODIPINE BESYLATE 5 MG/1
TABLET ORAL
Qty: 90 TABLET | Refills: 1 | Status: SHIPPED | OUTPATIENT
Start: 2023-08-10

## 2023-08-23 ENCOUNTER — LAB (OUTPATIENT)
Dept: LAB | Facility: HOSPITAL | Age: 73
End: 2023-08-23
Payer: MEDICARE

## 2023-08-23 ENCOUNTER — TRANSCRIBE ORDERS (OUTPATIENT)
Dept: LAB | Facility: HOSPITAL | Age: 73
End: 2023-08-23
Payer: MEDICARE

## 2023-08-23 DIAGNOSIS — R97.20 ELEVATED PROSTATE SPECIFIC ANTIGEN (PSA): ICD-10-CM

## 2023-08-23 DIAGNOSIS — E55.9 VITAMIN D DEFICIENCY: ICD-10-CM

## 2023-08-23 DIAGNOSIS — E78.2 MIXED HYPERLIPIDEMIA: ICD-10-CM

## 2023-08-23 DIAGNOSIS — R97.20 ELEVATED PROSTATE SPECIFIC ANTIGEN (PSA): Primary | ICD-10-CM

## 2023-08-23 DIAGNOSIS — R97.20 PSA ELEVATION: ICD-10-CM

## 2023-08-23 DIAGNOSIS — I10 HYPERTENSION, ESSENTIAL: ICD-10-CM

## 2023-08-23 DIAGNOSIS — M17.11 OSTEOARTHROSIS, LOCALIZED, PRIMARY, KNEE, RIGHT: ICD-10-CM

## 2023-08-23 DIAGNOSIS — I82.462 DEEP VEIN THROMBOSIS (DVT) OF CALF MUSCLE VEIN OF LEFT LOWER EXTREMITY, UNSPECIFIED CHRONICITY: ICD-10-CM

## 2023-08-23 DIAGNOSIS — R73.01 IFG (IMPAIRED FASTING GLUCOSE): ICD-10-CM

## 2023-08-23 DIAGNOSIS — Z12.5 SCREENING PSA (PROSTATE SPECIFIC ANTIGEN): ICD-10-CM

## 2023-08-23 DIAGNOSIS — R74.8 ELEVATED LIVER ENZYMES: ICD-10-CM

## 2023-08-23 LAB
ALBUMIN SERPL-MCNC: 4.5 G/DL (ref 3.5–5.2)
ALBUMIN/GLOB SERPL: 1.6 G/DL
ALP SERPL-CCNC: 88 U/L (ref 39–117)
ALT SERPL W P-5'-P-CCNC: 25 U/L (ref 1–41)
ANION GAP SERPL CALCULATED.3IONS-SCNC: 12.5 MMOL/L (ref 5–15)
AST SERPL-CCNC: 26 U/L (ref 1–40)
BASOPHILS # BLD AUTO: 0.02 10*3/MM3 (ref 0–0.2)
BASOPHILS NFR BLD AUTO: 0.4 % (ref 0–1.5)
BILIRUB SERPL-MCNC: 0.4 MG/DL (ref 0–1.2)
BUN SERPL-MCNC: 26 MG/DL (ref 8–23)
BUN/CREAT SERPL: 22 (ref 7–25)
CALCIUM SPEC-SCNC: 10.1 MG/DL (ref 8.6–10.5)
CHLORIDE SERPL-SCNC: 103 MMOL/L (ref 98–107)
CO2 SERPL-SCNC: 20.5 MMOL/L (ref 22–29)
CREAT SERPL-MCNC: 1.18 MG/DL (ref 0.76–1.27)
DEPRECATED RDW RBC AUTO: 44.2 FL (ref 37–54)
EGFRCR SERPLBLD CKD-EPI 2021: 66 ML/MIN/1.73
EOSINOPHIL # BLD AUTO: 0.3 10*3/MM3 (ref 0–0.4)
EOSINOPHIL NFR BLD AUTO: 6 % (ref 0.3–6.2)
ERYTHROCYTE [DISTWIDTH] IN BLOOD BY AUTOMATED COUNT: 13.3 % (ref 12.3–15.4)
GLOBULIN UR ELPH-MCNC: 2.9 GM/DL
GLUCOSE SERPL-MCNC: 106 MG/DL (ref 65–99)
HBA1C MFR BLD: 5.3 % (ref 4.8–5.6)
HCT VFR BLD AUTO: 39.4 % (ref 37.5–51)
HGB BLD-MCNC: 13.6 G/DL (ref 13–17.7)
IMM GRANULOCYTES # BLD AUTO: 0.01 10*3/MM3 (ref 0–0.05)
IMM GRANULOCYTES NFR BLD AUTO: 0.2 % (ref 0–0.5)
LYMPHOCYTES # BLD AUTO: 1.52 10*3/MM3 (ref 0.7–3.1)
LYMPHOCYTES NFR BLD AUTO: 30.2 % (ref 19.6–45.3)
MCH RBC QN AUTO: 31.5 PG (ref 26.6–33)
MCHC RBC AUTO-ENTMCNC: 34.5 G/DL (ref 31.5–35.7)
MCV RBC AUTO: 91.2 FL (ref 79–97)
MONOCYTES # BLD AUTO: 0.6 10*3/MM3 (ref 0.1–0.9)
MONOCYTES NFR BLD AUTO: 11.9 % (ref 5–12)
NEUTROPHILS NFR BLD AUTO: 2.59 10*3/MM3 (ref 1.7–7)
NEUTROPHILS NFR BLD AUTO: 51.3 % (ref 42.7–76)
NRBC BLD AUTO-RTO: 0 /100 WBC (ref 0–0.2)
PLATELET # BLD AUTO: 178 10*3/MM3 (ref 140–450)
PMV BLD AUTO: 9.6 FL (ref 6–12)
POTASSIUM SERPL-SCNC: 4.2 MMOL/L (ref 3.5–5.2)
PROT SERPL-MCNC: 7.4 G/DL (ref 6–8.5)
PSA SERPL-MCNC: 2.52 NG/ML (ref 0–4)
RBC # BLD AUTO: 4.32 10*6/MM3 (ref 4.14–5.8)
SODIUM SERPL-SCNC: 136 MMOL/L (ref 136–145)
WBC NRBC COR # BLD: 5.04 10*3/MM3 (ref 3.4–10.8)

## 2023-08-23 PROCEDURE — 80053 COMPREHEN METABOLIC PANEL: CPT

## 2023-08-23 PROCEDURE — 83036 HEMOGLOBIN GLYCOSYLATED A1C: CPT

## 2023-08-23 PROCEDURE — 84153 ASSAY OF PSA TOTAL: CPT

## 2023-08-23 PROCEDURE — 36415 COLL VENOUS BLD VENIPUNCTURE: CPT

## 2023-08-23 PROCEDURE — 85025 COMPLETE CBC W/AUTO DIFF WBC: CPT

## 2023-08-30 ENCOUNTER — OFFICE VISIT (OUTPATIENT)
Dept: INTERNAL MEDICINE | Facility: CLINIC | Age: 73
End: 2023-08-30
Payer: MEDICARE

## 2023-08-30 VITALS
BODY MASS INDEX: 31.69 KG/M2 | TEMPERATURE: 97.6 F | OXYGEN SATURATION: 92 % | WEIGHT: 234 LBS | DIASTOLIC BLOOD PRESSURE: 78 MMHG | HEART RATE: 62 BPM | SYSTOLIC BLOOD PRESSURE: 138 MMHG | HEIGHT: 72 IN

## 2023-08-30 DIAGNOSIS — I82.462 DEEP VEIN THROMBOSIS (DVT) OF CALF MUSCLE VEIN OF LEFT LOWER EXTREMITY, UNSPECIFIED CHRONICITY: ICD-10-CM

## 2023-08-30 DIAGNOSIS — M17.11 OSTEOARTHROSIS, LOCALIZED, PRIMARY, KNEE, RIGHT: ICD-10-CM

## 2023-08-30 DIAGNOSIS — E78.2 MIXED HYPERLIPIDEMIA: ICD-10-CM

## 2023-08-30 DIAGNOSIS — E55.9 VITAMIN D DEFICIENCY: ICD-10-CM

## 2023-08-30 DIAGNOSIS — I10 HYPERTENSION, ESSENTIAL: ICD-10-CM

## 2023-08-30 DIAGNOSIS — R73.01 IFG (IMPAIRED FASTING GLUCOSE): ICD-10-CM

## 2023-08-30 DIAGNOSIS — Z00.00 MEDICARE ANNUAL WELLNESS VISIT, SUBSEQUENT: ICD-10-CM

## 2023-08-30 DIAGNOSIS — Z12.5 SCREENING PSA (PROSTATE SPECIFIC ANTIGEN): Primary | ICD-10-CM

## 2023-08-30 DIAGNOSIS — R97.20 PSA ELEVATION: ICD-10-CM

## 2023-08-30 DIAGNOSIS — R74.8 ELEVATED LIVER ENZYMES: ICD-10-CM

## 2023-08-30 NOTE — PROGRESS NOTES
"CHIEF COMPLAINT/ HPI:  Hyperlipidemia (Routine follow up, Lab follow up. Pt states no concerns at this time. )  Patient is here to follow-up with previous right total knee arthroplasty blood pressure issues recent lab work elevated PSA in the past, elevated blood sugar says he is doing well he is getting around now, he did follow-up with urology several months ago,              Objective   Vital Signs  Vitals:    08/30/23 0845   BP: 138/78   Pulse: 62   Temp: 97.6 øF (36.4 øC)   SpO2: 92%   Weight: 106 kg (234 lb)   Height: 182.9 cm (72.01\")      Body mass index is 31.73 kg/mý.  Review of Systems   Constitutional: Negative.    HENT: Negative.     Eyes: Negative.    Respiratory: Negative.     Cardiovascular: Negative.    Gastrointestinal: Negative.    Endocrine: Negative.    Genitourinary: Negative.    Musculoskeletal:  Positive for joint swelling.   Allergic/Immunologic: Negative.    Neurological: Negative.    Hematological: Negative.    Psychiatric/Behavioral: Negative.      Physical Exam  Constitutional:       General: He is not in acute distress.     Appearance: Normal appearance.   HENT:      Head: Normocephalic.      Mouth/Throat:      Mouth: Mucous membranes are moist.   Eyes:      Conjunctiva/sclera: Conjunctivae normal.      Pupils: Pupils are equal, round, and reactive to light.   Cardiovascular:      Rate and Rhythm: Normal rate and regular rhythm.      Pulses: Normal pulses.      Heart sounds: Normal heart sounds.   Pulmonary:      Effort: Pulmonary effort is normal.      Breath sounds: Normal breath sounds.   Abdominal:      General: Abdomen is flat. Bowel sounds are normal.      Palpations: Abdomen is soft.   Musculoskeletal:         General: No swelling. Normal range of motion.      Cervical back: Neck supple.   Skin:     General: Skin is warm and dry.      Coloration: Skin is not jaundiced.   Neurological:      General: No focal deficit present.      Mental Status: He is alert and oriented to person, " place, and time. Mental status is at baseline.   Psychiatric:         Mood and Affect: Mood normal.         Behavior: Behavior normal.         Thought Content: Thought content normal.         Judgment: Judgment normal.      Result Review :   No results found for: PROBNP, BNP  CMP          1/27/2023    06:51 2/23/2023    04:27 8/23/2023    08:11   CMP   Glucose 95  114  106    BUN 25  29  26    Creatinine 1.26  1.19  1.18    EGFR 61.0  65.3  66.0    Sodium 136  133  136    Potassium 4.3  4.2  4.2    Chloride 101  100  103    Calcium 10.3  8.8  10.1    Total Protein 7.8   7.4    Albumin 4.9   4.5    Globulin 2.9   2.9    Total Bilirubin 0.5   0.4    Alkaline Phosphatase 94   88    AST (SGOT) 28   26    ALT (SGPT) 29   25    Albumin/Globulin Ratio 1.7   1.6    BUN/Creatinine Ratio 19.8  24.4  22.0    Anion Gap 6.6  9.1  12.5      CBC w/diff          1/27/2023    06:51 2/22/2023    04:27 8/23/2023    08:11   CBC w/Diff   WBC 5.33  9.57  5.04    RBC 4.30  3.58  4.32    Hemoglobin 13.8  11.4  13.6    Hematocrit 40.0  32.7  39.4    MCV 93.0  91.3  91.2    MCH 32.1  31.8  31.5    MCHC 34.5  34.9  34.5    RDW 13.0  12.5  13.3    Platelets 176  167  178    Neutrophil Rel % 58.0   51.3    Immature Granulocyte Rel % 0.2   0.2    Lymphocyte Rel % 23.6   30.2    Monocyte Rel % 11.6   11.9    Eosinophil Rel % 5.8   6.0    Basophil Rel % 0.8   0.4       Lipid Panel          1/27/2023    06:51   Lipid Panel   Total Cholesterol 201    Triglycerides 236    HDL Cholesterol 51    VLDL Cholesterol 41    LDL Cholesterol  109    LDL/HDL Ratio 2.02       Lab Results   Component Value Date    TSH 2.420 01/07/2019      No results found for: FREET4   A1C Last 3 Results          1/27/2023    06:51 8/23/2023    08:11   HGBA1C Last 3 Results   Hemoglobin A1C 5.90  5.30       PSA          1/27/2023    06:51 8/23/2023    08:11   PSA   PSA 3.860  2.520                     Visit Diagnoses:    ICD-10-CM ICD-9-CM   1. Screening PSA (prostate specific  antigen)  Z12.5 V76.44   2. Vitamin D deficiency  E55.9 268.9   3. Hypertension, essential  I10 401.9   4. Deep vein thrombosis (DVT) of calf muscle vein of left lower extremity, unspecified chronicity  I82.462 453.42   5. Osteoarthrosis, localized, primary, knee, right  M17.11 715.16   6. Mixed hyperlipidemia  E78.2 272.2   7. IFG (impaired fasting glucose)  R73.01 790.21   8. Elevated liver enzymes  R74.8 790.5   9. PSA elevation  R97.20 790.93   10. Medicare annual wellness visit, subsequent  Z00.00 V70.0       Assessment and Plan   Diagnoses and all orders for this visit:    1. Screening PSA (prostate specific antigen) (Primary)  -     Hemoglobin A1c; Future  -     CBC & Differential; Future  -     Comprehensive Metabolic Panel; Future  -     Lipid Panel; Future    2. Vitamin D deficiency  -     Hemoglobin A1c; Future  -     CBC & Differential; Future  -     Comprehensive Metabolic Panel; Future  -     Lipid Panel; Future    3. Hypertension, essential  -     Hemoglobin A1c; Future  -     CBC & Differential; Future  -     Comprehensive Metabolic Panel; Future  -     Lipid Panel; Future    4. Deep vein thrombosis (DVT) of calf muscle vein of left lower extremity, unspecified chronicity  -     Hemoglobin A1c; Future  -     CBC & Differential; Future  -     Comprehensive Metabolic Panel; Future  -     Lipid Panel; Future    5. Osteoarthrosis, localized, primary, knee, right  -     Hemoglobin A1c; Future  -     CBC & Differential; Future  -     Comprehensive Metabolic Panel; Future  -     Lipid Panel; Future    6. Mixed hyperlipidemia  -     Hemoglobin A1c; Future  -     CBC & Differential; Future  -     Comprehensive Metabolic Panel; Future  -     Lipid Panel; Future    7. IFG (impaired fasting glucose)  -     Hemoglobin A1c; Future  -     CBC & Differential; Future  -     Comprehensive Metabolic Panel; Future  -     Lipid Panel; Future    8. Elevated liver enzymes  -     Hemoglobin A1c; Future  -     CBC &  Differential; Future  -     Comprehensive Metabolic Panel; Future  -     Lipid Panel; Future    9. PSA elevation  -     Hemoglobin A1c; Future  -     CBC & Differential; Future  -     Comprehensive Metabolic Panel; Future  -     Lipid Panel; Future    10. Medicare annual wellness visit, subsequent  -     Hemoglobin A1c; Future  -     CBC & Differential; Future  -     Comprehensive Metabolic Panel; Future  -     Lipid Panel; Future             Osteoarthritis of the knees, status post right total knee arthroplasty with postop infection, treated improved February 2023,    dvt remote 2010, leg left after left tka ,     Elevated PSA 3.8, from 2.8 previously---, would recommend urology appointment again, discussed February 2023, numbers have steadily increased over the past 2 years,----PSA down to 2.5 August 2023    HTN , CONT IRBESARTAN 300 MG QD, CHLORTHALIDONE 25 MG QD , AMLODIPINE 5 MG QHS     Impaired fasting glucose,, down to 5.3 August 23, 2023    Mixed hyperlipidemia continues  Crestor 20 mg daily,    Prior colonoscopy 2018 Dr. Erwin 2 polyps, and May 2021 Dr. Braswell, 4 mm polyp ascending colon internal hemorrhoids and diverticuli otherwise unremarkable,    Previous laparoscopic appendectomy February 2018 for acute appendicitis    Previous left total knee arthroplasty 2010    Vitamin D deficiency---CONT OTC REPLACEMENT     Follow Up   No follow-ups on file.  Patient was given instructions and counseling regarding his condition or for health maintenance advice. Please see specific information pulled into the AVS if appropriate.       Answers submitted by the patient for this visit:  Primary Reason for Visit (Submitted on 8/23/2023)  What is the primary reason for your visit?: Physical

## 2023-08-30 NOTE — PROGRESS NOTES
The ABCs of the Annual Wellness Visit  Subsequent Medicare Wellness Visit    Subjective      Herb Dimas is a 72 y.o. male who presents for a Subsequent Medicare Wellness Visit.    The following portions of the patient's history were reviewed and   updated as appropriate: allergies, current medications, past family history, past medical history, past social history, past surgical history, and problem list.    Compared to one year ago, the patient feels his physical   health is the same.    Compared to one year ago, the patient feels his mental   health is the same.    Recent Hospitalizations:  He was admitted within the past 365 days at [facility] [place].       Current Medical Providers:  Patient Care Team:  Clemente Park MD as PCP - General (Internal Medicine)  Casimiro April, APRALEKSEY as Nurse Practitioner (Nurse Practitioner)    Outpatient Medications Prior to Visit   Medication Sig Dispense Refill    amLODIPine (NORVASC) 5 MG tablet TAKE 1 TABLET AT BEDTIME 90 tablet 1    ASPIRIN 81 PO       chlorthalidone (HYGROTON) 25 MG tablet TAKE 1 TABLET EVERY DAY 90 tablet 3    cholecalciferol (VITAMIN D3) 25 MCG (1000 UT) tablet Take 1 tablet by mouth Every Night.      irbesartan (AVAPRO) 300 MG tablet TAKE 1 TABLET EVERY DAY 90 tablet 3    multivitamin with minerals tablet tablet Take 1 tablet by mouth Every Night.      rosuvastatin (CRESTOR) 20 MG tablet TAKE 1 TABLET AT BEDTIME 90 tablet 3    sildenafil (Viagra) 50 MG tablet Take 1 tablet by mouth Daily As Needed for Erectile Dysfunction. 10 tablet 3    vitamin C (ASCORBIC ACID) 250 MG tablet Take 2 tablets by mouth Every Night.      aspirin  MG tablet Take 1 tablet by mouth Daily. 7 tablet 0    cephalexin (KEFLEX) 500 MG capsule Take 1 capsule by mouth Every 6 (Six) Hours. 40 capsule 0    HYDROcodone-acetaminophen (NORCO) 5-325 MG per tablet Take 1 tablet by mouth Every 6 (Six) Hours As Needed (as needed). 28 tablet 0    ondansetron (Zofran) 4 MG  "tablet Take 1 tablet by mouth Every 8 (Eight) Hours As Needed for Nausea or Vomiting. 25 tablet 0     No facility-administered medications prior to visit.       No opioid medication identified on active medication list. I have reviewed chart for other potential  high risk medication/s and harmful drug interactions in the elderly.        Aspirin is on active medication list. Aspirin use is indicated based on review of current medical condition/s. Pros and cons of this therapy have been discussed today. Benefits of this medication outweigh potential harm.  Patient has been encouraged to continue taking this medication.  .      Patient Active Problem List   Diagnosis    Vitamin D deficiency    Hypertension, essential    Elevated liver enzymes    Mixed hyperlipidemia    Deep vein thrombosis (DVT) of calf muscle vein of left lower extremity    Primary osteoarthritis of both knees    Screening PSA (prostate specific antigen)    IFG (impaired fasting glucose)    Right calf pain    Osteoarthrosis, localized, primary, knee, right    PSA elevation    Medicare annual wellness visit, subsequent     Advance Care Planning   Advance Care Planning     Advance Directive is on file.  ACP discussion was held with the patient during this visit. Patient has an advance directive in EMR which is still valid.      Objective    Vitals:    08/30/23 0845   BP: 138/78   Pulse: 62   Temp: 97.6 øF (36.4 øC)   SpO2: 92%   Weight: 106 kg (234 lb)   Height: 182.9 cm (72.01\")     Estimated body mass index is 31.73 kg/mý as calculated from the following:    Height as of this encounter: 182.9 cm (72.01\").    Weight as of this encounter: 106 kg (234 lb).    BMI is >= 30 and <35. (Class 1 Obesity). The following options were offered after discussion;: weight loss educational material (shared in after visit summary), exercise counseling/recommendations, and nutrition counseling/recommendations      Does the patient have evidence of cognitive impairment? "   No    Lab Results   Component Value Date    HGBA1C 5.30 2023          HEALTH RISK ASSESSMENT    Smoking Status:  Social History     Tobacco Use   Smoking Status Former    Packs/day: 0.50    Years: 5.00    Pack years: 2.50    Types: Cigarettes    Start date: 1970    Quit date: 1976    Years since quittin.7   Smokeless Tobacco Never     Alcohol Consumption:  Social History     Substance and Sexual Activity   Alcohol Use Yes    Alcohol/week: 4.0 standard drinks    Types: 4 Cans of beer per week    Comment: Occasionally     Fall Risk Screen:    GABINO Fall Risk Assessment was completed, and patient is at LOW risk for falls.Assessment completed on:2023    Depression Screenin/8/2023     1:26 PM   PHQ-2/PHQ-9 Depression Screening   Little Interest or Pleasure in Doing Things 0-->not at all   Feeling Down, Depressed or Hopeless 0-->not at all   PHQ-9: Brief Depression Severity Measure Score 0       Health Habits and Functional and Cognitive Screenin/30/2023     8:00 AM   Functional & Cognitive Status   Do you have difficulty preparing food and eating? No   Do you have difficulty bathing yourself, getting dressed or grooming yourself? No   Do you have difficulty using the toilet? No   Do you have difficulty moving around from place to place? No   Do you have trouble with steps or getting out of a bed or a chair? No   Do you need help using the phone?  No   Are you deaf or do you have serious difficulty hearing?  No   Do you need help to go to places out of walking distance? No   Do you need help shopping? No   Do you need help preparing meals?  No   Do you need help with housework?  No   Do you need help with laundry? No   Do you need help taking your medications? No   Do you need help managing money? No   Do you ever drive or ride in a car without wearing a seat belt? No   Do you have difficulty concentrating, remembering or making decisions? No       Age-appropriate Screening  Schedule:  Refer to the list below for future screening recommendations based on patient's age, sex and/or medical conditions. Orders for these recommended tests are listed in the plan section. The patient has been provided with a written plan.    Health Maintenance   Topic Date Due    URINE MICROALBUMIN  Never done    Pneumococcal Vaccine 65+ (1 - PCV) Never done    TDAP/TD VACCINES (1 - Tdap) Never done    ZOSTER VACCINE (1 of 2) Never done    DIABETIC FOOT EXAM  Never done    COVID-19 Vaccine (4 - Pfizer series) 11/29/2021    DIABETIC EYE EXAM  11/30/2022    BMI FOLLOWUP  08/02/2023    INFLUENZA VACCINE  10/01/2023    LIPID PANEL  01/27/2024    HEMOGLOBIN A1C  02/23/2024    ANNUAL WELLNESS VISIT  08/30/2024    COLORECTAL CANCER SCREENING  05/18/2026    HEPATITIS C SCREENING  Completed    AAA SCREEN (ONE-TIME)  Completed                  CMS Preventative Services Quick Reference  Risk Factors Identified During Encounter:    None Identified    The above risks/problems have been discussed with the patient.  Pertinent information has been shared with the patient in the After Visit Summary.    Diagnoses and all orders for this visit:    1. Screening PSA (prostate specific antigen) (Primary)  -     Hemoglobin A1c; Future  -     CBC & Differential; Future  -     Comprehensive Metabolic Panel; Future  -     Lipid Panel; Future    2. Vitamin D deficiency  -     Hemoglobin A1c; Future  -     CBC & Differential; Future  -     Comprehensive Metabolic Panel; Future  -     Lipid Panel; Future    3. Hypertension, essential  -     Hemoglobin A1c; Future  -     CBC & Differential; Future  -     Comprehensive Metabolic Panel; Future  -     Lipid Panel; Future    4. Deep vein thrombosis (DVT) of calf muscle vein of left lower extremity, unspecified chronicity  -     Hemoglobin A1c; Future  -     CBC & Differential; Future  -     Comprehensive Metabolic Panel; Future  -     Lipid Panel; Future    5. Osteoarthrosis, localized,  primary, knee, right  -     Hemoglobin A1c; Future  -     CBC & Differential; Future  -     Comprehensive Metabolic Panel; Future  -     Lipid Panel; Future    6. Mixed hyperlipidemia  -     Hemoglobin A1c; Future  -     CBC & Differential; Future  -     Comprehensive Metabolic Panel; Future  -     Lipid Panel; Future    7. IFG (impaired fasting glucose)  -     Hemoglobin A1c; Future  -     CBC & Differential; Future  -     Comprehensive Metabolic Panel; Future  -     Lipid Panel; Future    8. Elevated liver enzymes  -     Hemoglobin A1c; Future  -     CBC & Differential; Future  -     Comprehensive Metabolic Panel; Future  -     Lipid Panel; Future    9. PSA elevation  -     Hemoglobin A1c; Future  -     CBC & Differential; Future  -     Comprehensive Metabolic Panel; Future  -     Lipid Panel; Future    10. Medicare annual wellness visit, subsequent  -     Hemoglobin A1c; Future  -     CBC & Differential; Future  -     Comprehensive Metabolic Panel; Future  -     Lipid Panel; Future        Follow Up:   Next Medicare Wellness visit to be scheduled in 1 year.      An After Visit Summary and PPPS were made available to the patient.          Answers submitted by the patient for this visit:  Primary Reason for Visit (Submitted on 8/23/2023)  What is the primary reason for your visit?: Physical

## 2024-04-17 ENCOUNTER — LAB (OUTPATIENT)
Dept: LAB | Facility: HOSPITAL | Age: 74
End: 2024-04-17
Payer: MEDICARE

## 2024-04-17 DIAGNOSIS — I10 HYPERTENSION, ESSENTIAL: ICD-10-CM

## 2024-04-17 DIAGNOSIS — M17.11 OSTEOARTHROSIS, LOCALIZED, PRIMARY, KNEE, RIGHT: ICD-10-CM

## 2024-04-17 DIAGNOSIS — R74.8 ELEVATED LIVER ENZYMES: ICD-10-CM

## 2024-04-17 DIAGNOSIS — E55.9 VITAMIN D DEFICIENCY: ICD-10-CM

## 2024-04-17 DIAGNOSIS — Z12.5 SCREENING PSA (PROSTATE SPECIFIC ANTIGEN): ICD-10-CM

## 2024-04-17 DIAGNOSIS — I82.462 DEEP VEIN THROMBOSIS (DVT) OF CALF MUSCLE VEIN OF LEFT LOWER EXTREMITY, UNSPECIFIED CHRONICITY: ICD-10-CM

## 2024-04-17 DIAGNOSIS — R73.01 IFG (IMPAIRED FASTING GLUCOSE): ICD-10-CM

## 2024-04-17 DIAGNOSIS — R97.20 PSA ELEVATION: ICD-10-CM

## 2024-04-17 DIAGNOSIS — E78.2 MIXED HYPERLIPIDEMIA: ICD-10-CM

## 2024-04-17 DIAGNOSIS — Z00.00 MEDICARE ANNUAL WELLNESS VISIT, SUBSEQUENT: ICD-10-CM

## 2024-04-17 LAB
ALBUMIN SERPL-MCNC: 4.5 G/DL (ref 3.5–5.2)
ALBUMIN/GLOB SERPL: 1.5 G/DL
ALP SERPL-CCNC: 87 U/L (ref 39–117)
ALT SERPL W P-5'-P-CCNC: 29 U/L (ref 1–41)
ANION GAP SERPL CALCULATED.3IONS-SCNC: 10 MMOL/L (ref 5–15)
AST SERPL-CCNC: 20 U/L (ref 1–40)
BASOPHILS # BLD AUTO: 0.03 10*3/MM3 (ref 0–0.2)
BASOPHILS NFR BLD AUTO: 0.5 % (ref 0–1.5)
BILIRUB SERPL-MCNC: 0.5 MG/DL (ref 0–1.2)
BUN SERPL-MCNC: 27 MG/DL (ref 8–23)
BUN/CREAT SERPL: 21.3 (ref 7–25)
CALCIUM SPEC-SCNC: 8.2 MG/DL (ref 8.6–10.5)
CHLORIDE SERPL-SCNC: 105 MMOL/L (ref 98–107)
CHOLEST SERPL-MCNC: 177 MG/DL (ref 0–200)
CO2 SERPL-SCNC: 24 MMOL/L (ref 22–29)
CREAT SERPL-MCNC: 1.27 MG/DL (ref 0.76–1.27)
DEPRECATED RDW RBC AUTO: 45.5 FL (ref 37–54)
EGFRCR SERPLBLD CKD-EPI 2021: 60 ML/MIN/1.73
EOSINOPHIL # BLD AUTO: 0.22 10*3/MM3 (ref 0–0.4)
EOSINOPHIL NFR BLD AUTO: 3.9 % (ref 0.3–6.2)
ERYTHROCYTE [DISTWIDTH] IN BLOOD BY AUTOMATED COUNT: 13.3 % (ref 12.3–15.4)
GLOBULIN UR ELPH-MCNC: 3 GM/DL
GLUCOSE SERPL-MCNC: 101 MG/DL (ref 65–99)
HBA1C MFR BLD: 5.8 % (ref 4.8–5.6)
HCT VFR BLD AUTO: 40.2 % (ref 37.5–51)
HDLC SERPL-MCNC: 46 MG/DL (ref 40–60)
HGB BLD-MCNC: 13.4 G/DL (ref 13–17.7)
IMM GRANULOCYTES # BLD AUTO: 0.01 10*3/MM3 (ref 0–0.05)
IMM GRANULOCYTES NFR BLD AUTO: 0.2 % (ref 0–0.5)
LDLC SERPL CALC-MCNC: 94 MG/DL (ref 0–100)
LDLC/HDLC SERPL: 1.89 {RATIO}
LYMPHOCYTES # BLD AUTO: 1.47 10*3/MM3 (ref 0.7–3.1)
LYMPHOCYTES NFR BLD AUTO: 25.8 % (ref 19.6–45.3)
MCH RBC QN AUTO: 31 PG (ref 26.6–33)
MCHC RBC AUTO-ENTMCNC: 33.3 G/DL (ref 31.5–35.7)
MCV RBC AUTO: 93.1 FL (ref 79–97)
MONOCYTES # BLD AUTO: 0.54 10*3/MM3 (ref 0.1–0.9)
MONOCYTES NFR BLD AUTO: 9.5 % (ref 5–12)
NEUTROPHILS NFR BLD AUTO: 3.43 10*3/MM3 (ref 1.7–7)
NEUTROPHILS NFR BLD AUTO: 60.1 % (ref 42.7–76)
NRBC BLD AUTO-RTO: 0 /100 WBC (ref 0–0.2)
PLATELET # BLD AUTO: 172 10*3/MM3 (ref 140–450)
PMV BLD AUTO: 9.5 FL (ref 6–12)
POTASSIUM SERPL-SCNC: 4.4 MMOL/L (ref 3.5–5.2)
PROT SERPL-MCNC: 7.5 G/DL (ref 6–8.5)
RBC # BLD AUTO: 4.32 10*6/MM3 (ref 4.14–5.8)
SODIUM SERPL-SCNC: 139 MMOL/L (ref 136–145)
TRIGL SERPL-MCNC: 220 MG/DL (ref 0–150)
VLDLC SERPL-MCNC: 37 MG/DL (ref 5–40)
WBC NRBC COR # BLD AUTO: 5.7 10*3/MM3 (ref 3.4–10.8)

## 2024-04-17 PROCEDURE — 80053 COMPREHEN METABOLIC PANEL: CPT

## 2024-04-17 PROCEDURE — 83036 HEMOGLOBIN GLYCOSYLATED A1C: CPT

## 2024-04-17 PROCEDURE — 36415 COLL VENOUS BLD VENIPUNCTURE: CPT

## 2024-04-17 PROCEDURE — 85025 COMPLETE CBC W/AUTO DIFF WBC: CPT

## 2024-04-17 PROCEDURE — 80061 LIPID PANEL: CPT

## 2024-04-24 ENCOUNTER — OFFICE VISIT (OUTPATIENT)
Dept: INTERNAL MEDICINE | Age: 74
End: 2024-04-24
Payer: MEDICARE

## 2024-04-24 VITALS
DIASTOLIC BLOOD PRESSURE: 73 MMHG | SYSTOLIC BLOOD PRESSURE: 151 MMHG | HEIGHT: 72 IN | HEART RATE: 73 BPM | BODY MASS INDEX: 32.51 KG/M2 | OXYGEN SATURATION: 96 % | WEIGHT: 240 LBS | TEMPERATURE: 98.4 F

## 2024-04-24 DIAGNOSIS — M17.0 PRIMARY OSTEOARTHRITIS OF BOTH KNEES: ICD-10-CM

## 2024-04-24 DIAGNOSIS — Z12.5 SCREENING PSA (PROSTATE SPECIFIC ANTIGEN): ICD-10-CM

## 2024-04-24 DIAGNOSIS — I10 HYPERTENSION, ESSENTIAL: Primary | ICD-10-CM

## 2024-04-24 DIAGNOSIS — R73.01 IFG (IMPAIRED FASTING GLUCOSE): ICD-10-CM

## 2024-04-24 DIAGNOSIS — E78.2 MIXED HYPERLIPIDEMIA: ICD-10-CM

## 2024-04-24 DIAGNOSIS — I82.462 DEEP VEIN THROMBOSIS (DVT) OF CALF MUSCLE VEIN OF LEFT LOWER EXTREMITY, UNSPECIFIED CHRONICITY: ICD-10-CM

## 2024-04-24 DIAGNOSIS — R74.8 ELEVATED LIVER ENZYMES: ICD-10-CM

## 2024-04-24 DIAGNOSIS — R97.20 PSA ELEVATION: ICD-10-CM

## 2024-04-24 DIAGNOSIS — E55.9 VITAMIN D DEFICIENCY: ICD-10-CM

## 2024-04-24 PROCEDURE — 3077F SYST BP >= 140 MM HG: CPT | Performed by: INTERNAL MEDICINE

## 2024-04-24 PROCEDURE — 99214 OFFICE O/P EST MOD 30 MIN: CPT | Performed by: INTERNAL MEDICINE

## 2024-04-24 PROCEDURE — 1170F FXNL STATUS ASSESSED: CPT | Performed by: INTERNAL MEDICINE

## 2024-04-24 PROCEDURE — G2211 COMPLEX E/M VISIT ADD ON: HCPCS | Performed by: INTERNAL MEDICINE

## 2024-04-24 PROCEDURE — 3078F DIAST BP <80 MM HG: CPT | Performed by: INTERNAL MEDICINE

## 2024-04-24 NOTE — PROGRESS NOTES
"CHIEF COMPLAINT/ HPI: doing well , no cp ,no soa     Hypertension (Routine follow up. Lab follow up. No concerns at this time. )    Walks everyday           Objective   Vital Signs  Vitals:    04/24/24 0900   BP: 151/73   Pulse: 73   Temp: 98.4 °F (36.9 °C)   SpO2: 96%   Weight: 109 kg (240 lb)   Height: 182.9 cm (72.01\")      Body mass index is 32.54 kg/m².  Review of Systems   Constitutional: Negative.    HENT: Negative.     Eyes: Negative.    Respiratory: Negative.     Cardiovascular: Negative.    Gastrointestinal: Negative.    Endocrine: Negative.    Genitourinary: Negative.    Musculoskeletal: Negative.    Allergic/Immunologic: Negative.    Neurological: Negative.    Hematological: Negative.    Psychiatric/Behavioral: Negative.        Physical Exam  Constitutional:       General: He is not in acute distress.     Appearance: Normal appearance.   HENT:      Head: Normocephalic.      Mouth/Throat:      Mouth: Mucous membranes are moist.   Eyes:      Conjunctiva/sclera: Conjunctivae normal.      Pupils: Pupils are equal, round, and reactive to light.   Cardiovascular:      Rate and Rhythm: Normal rate and regular rhythm.      Pulses: Normal pulses.      Heart sounds: Normal heart sounds.   Pulmonary:      Effort: Pulmonary effort is normal.      Breath sounds: Normal breath sounds.   Abdominal:      General: Abdomen is flat. Bowel sounds are normal.      Palpations: Abdomen is soft.   Musculoskeletal:         General: No swelling. Normal range of motion.      Cervical back: Neck supple.   Skin:     General: Skin is warm and dry.      Coloration: Skin is not jaundiced.   Neurological:      General: No focal deficit present.      Mental Status: He is alert and oriented to person, place, and time. Mental status is at baseline.   Psychiatric:         Mood and Affect: Mood normal.         Behavior: Behavior normal.         Thought Content: Thought content normal.         Judgment: Judgment normal.        Result Review : " "  No results found for: \"PROBNP\", \"BNP\"  CMP          8/23/2023    08:11 4/17/2024    08:33   CMP   Glucose 106  101    BUN 26  27    Creatinine 1.18  1.27    EGFR 66.0  60.0    Sodium 136  139    Potassium 4.2  4.4    Chloride 103  105    Calcium 10.1  8.2    Total Protein 7.4  7.5    Albumin 4.5  4.5    Globulin 2.9  3.0    Total Bilirubin 0.4  0.5    Alkaline Phosphatase 88  87    AST (SGOT) 26  20    ALT (SGPT) 25  29    Albumin/Globulin Ratio 1.6  1.5    BUN/Creatinine Ratio 22.0  21.3    Anion Gap 12.5  10.0      CBC w/diff          8/23/2023    08:11 4/17/2024    08:33   CBC w/Diff   WBC 5.04  5.70    RBC 4.32  4.32    Hemoglobin 13.6  13.4    Hematocrit 39.4  40.2    MCV 91.2  93.1    MCH 31.5  31.0    MCHC 34.5  33.3    RDW 13.3  13.3    Platelets 178  172    Neutrophil Rel % 51.3  60.1    Immature Granulocyte Rel % 0.2  0.2    Lymphocyte Rel % 30.2  25.8    Monocyte Rel % 11.9  9.5    Eosinophil Rel % 6.0  3.9    Basophil Rel % 0.4  0.5       Lipid Panel          4/17/2024    08:33   Lipid Panel   Total Cholesterol 177    Triglycerides 220    HDL Cholesterol 46    VLDL Cholesterol 37    LDL Cholesterol  94    LDL/HDL Ratio 1.89       Lab Results   Component Value Date    TSH 2.420 01/07/2019      No results found for: \"FREET4\"   A1C Last 3 Results          8/23/2023    08:11 4/17/2024    08:33   HGBA1C Last 3 Results   Hemoglobin A1C 5.30  5.80       PSA          8/23/2023    08:11   PSA   PSA 2.520                     Visit Diagnoses:    ICD-10-CM ICD-9-CM   1. Hypertension, essential  I10 401.9   2. Primary osteoarthritis of both knees  M17.0 715.16   3. PSA elevation  R97.20 790.93   4. IFG (impaired fasting glucose)  R73.01 790.21   5. Mixed hyperlipidemia  E78.2 272.2   6. Deep vein thrombosis (DVT) of calf muscle vein of left lower extremity, unspecified chronicity  I82.462 453.42   7. Elevated liver enzymes  R74.8 790.5   8. Vitamin D deficiency  E55.9 268.9   9. Screening PSA (prostate specific " antigen)  Z12.5 V76.44       Assessment and Plan   Diagnoses and all orders for this visit:    1. Hypertension, essential (Primary)  -     CBC & Differential; Future  -     Comprehensive Metabolic Panel; Future  -     Lipid Panel; Future  -     PSA Screen; Future  -     Hemoglobin A1c; Future    2. Primary osteoarthritis of both knees  -     CBC & Differential; Future  -     Comprehensive Metabolic Panel; Future  -     Lipid Panel; Future  -     PSA Screen; Future  -     Hemoglobin A1c; Future    3. PSA elevation  -     CBC & Differential; Future  -     Comprehensive Metabolic Panel; Future  -     Lipid Panel; Future  -     PSA Screen; Future  -     Hemoglobin A1c; Future    4. IFG (impaired fasting glucose)  -     CBC & Differential; Future  -     Comprehensive Metabolic Panel; Future  -     Lipid Panel; Future  -     PSA Screen; Future  -     Hemoglobin A1c; Future    5. Mixed hyperlipidemia  -     CBC & Differential; Future  -     Comprehensive Metabolic Panel; Future  -     Lipid Panel; Future  -     PSA Screen; Future  -     Hemoglobin A1c; Future    6. Deep vein thrombosis (DVT) of calf muscle vein of left lower extremity, unspecified chronicity  -     CBC & Differential; Future  -     Comprehensive Metabolic Panel; Future  -     Lipid Panel; Future  -     PSA Screen; Future  -     Hemoglobin A1c; Future    7. Elevated liver enzymes  -     CBC & Differential; Future  -     Comprehensive Metabolic Panel; Future  -     Lipid Panel; Future  -     PSA Screen; Future  -     Hemoglobin A1c; Future    8. Vitamin D deficiency  -     CBC & Differential; Future  -     Comprehensive Metabolic Panel; Future  -     Lipid Panel; Future  -     PSA Screen; Future  -     Hemoglobin A1c; Future    9. Screening PSA (prostate specific antigen)  -     CBC & Differential; Future  -     Comprehensive Metabolic Panel; Future  -     Lipid Panel; Future  -     PSA Screen; Future  -     Hemoglobin A1c; Future        right total knee  arthroplasty with postop infection, treated improved February 2023,    dvt remote 2010, leg left after left tka ,     Elevated PSA down to 2.5 August 2023    HTN , CONT IRBESARTAN 300 MG QD, CHLORTHALIDONE 25 MG QD , AMLODIPINE 5 MG QHS     Impaired fasting glucose,, down to 5.8 April 2024     Mixed hyperlipidemia continues  Crestor 20 mg daily,    Prior colonoscopy 2018 Dr. Erwin 2 polyps, and May 2021 Dr. Braswell, 4 mm polyp ascending colon internal hemorrhoids and diverticuli otherwise unremarkable,    Previous laparoscopic appendectomy February 2018 for acute appendicitis    Previous left total knee arthroplasty 2010    Vitamin D deficiency---CONT OTC REPLACEMENT               Follow Up   Return in about 6 months (around 10/24/2024).  Patient was given instructions and counseling regarding his condition or for health maintenance advice. Please see specific information pulled into the AVS if appropriate.           Answers submitted by the patient for this visit:  Primary Reason for Visit (Submitted on 4/17/2024)  What is the primary reason for your visit?: Other  Other (Submitted on 4/17/2024)  Please describe your symptoms.: Annual visit  Have you had these symptoms before?: No  How long have you been having these symptoms?: 1-4 days  Please list any medications you are currently taking for this condition.: N/A  Please describe any probable cause for these symptoms. : N/A

## 2024-04-29 RX ORDER — AMLODIPINE BESYLATE 5 MG/1
TABLET ORAL
Qty: 90 TABLET | Refills: 3 | Status: SHIPPED | OUTPATIENT
Start: 2024-04-29

## 2024-05-22 ENCOUNTER — OFFICE VISIT (OUTPATIENT)
Dept: ORTHOPEDIC SURGERY | Facility: CLINIC | Age: 74
End: 2024-05-22
Payer: MEDICARE

## 2024-05-22 VITALS
DIASTOLIC BLOOD PRESSURE: 72 MMHG | WEIGHT: 240 LBS | OXYGEN SATURATION: 95 % | BODY MASS INDEX: 32.51 KG/M2 | SYSTOLIC BLOOD PRESSURE: 131 MMHG | HEIGHT: 72 IN | HEART RATE: 50 BPM

## 2024-05-22 DIAGNOSIS — M25.561 RIGHT KNEE PAIN, UNSPECIFIED CHRONICITY: Primary | ICD-10-CM

## 2024-05-22 DIAGNOSIS — Z96.651 S/P TOTAL KNEE ARTHROPLASTY, RIGHT: ICD-10-CM

## 2024-05-30 NOTE — PROGRESS NOTES
"Chief Complaint  Follow-up and Pain of the Right Knee    Subjective      Herb Dimas presents to [facility] for follow-up of right total knee arthroplasty with Marguerite lizama with Dr. Ceja on 2/21/2023.  Patient is currently 15 months postop and is here today for his annual checkup.  Reports that he has no issues with the knee but continues to have some difficulty getting in and out of the car.  He has been able to resume normal activities.    Objective   No Known Allergies    Vital Signs:   /72   Pulse 50   Ht 182.9 cm (72\")   Wt 109 kg (240 lb)   SpO2 95%   BMI 32.55 kg/m²       Physical Exam    Constitutional: Awake, alert. Well nourished appearance.    Integumentary: Warm, dry, intact. No obvious rashes.    HENT: Atraumatic, normocephalic.   Respiratory: Non labored respirations .   Cardiovascular: Intact peripheral pulses.    Psychiatric: Normal mood and affect. A&O X3    Ortho Exam  Right knee: Range of motion 0 to 130 degrees.  Stable to varus and valgus stress.  Stable to anterior and posterior drawer test.  Incision is completely healed.  Neurovascular intact.  Sensation is intact.  No effusion noted.  Ambulatory with nonantalgic gait.    Imaging Results (Most Recent)       Procedure Component Value Units Date/Time    XR Knee 3 View Right [[id number]] Resulted: 05/22/24 1457     Updated: 05/22/24 1457    Narrative:      X-Ray Report:  Study: X-rays ordered, taken in the office, and reviewed today.   Site: Right knee xray  Indication: Right TKA  View: AP/Lateral, Standing, and [place] view(s)  Findings: Intact right total knee arthroplasty. No evidence of hardware   malfunction or periprosthetic fracture.  Patella centrally tracking.   Prior studies available for comparison: yes                       Assessment and Plan   Problem List Items Addressed This Visit    None  Visit Diagnoses       Right knee pain, unspecified chronicity    -  Primary    Relevant Orders    XR " Knee 3 View Right (Completed)    S/P total knee arthroplasty, right                Follow Up   Return if symptoms worsen or fail to improve.    Patient is a non-smoker, did not discuss options for smoking cessation.     Social History     Socioeconomic History    Marital status:    Tobacco Use    Smoking status: Former     Current packs/day: 0.00     Average packs/day: 0.5 packs/day for 6.8 years (3.4 ttl pk-yrs)     Types: Cigarettes     Start date: 1970     Quit date: 1976     Years since quittin.5    Smokeless tobacco: Never   Vaping Use    Vaping status: Never Used   Substance and Sexual Activity    Alcohol use: Yes     Alcohol/week: 4.0 standard drinks of alcohol     Types: 4 Cans of beer per week     Comment: Occasionally    Drug use: Never    Sexual activity: Not Currently     Partners: Female       Patient Instructions   X-rays taken and reviewed with patient.  Hardware is intact.    Advised patient to gradually resume normal activities as tolerated.  Advised on dental prophylaxis.  Advised on 2 to 3-year follow-up with repeat x-rays.      Patient may follow-up as needed.  Call with questions, concerns or worsening symptoms.   Patient was given instructions and counseling regarding his condition or for health maintenance advice. Please see specific information pulled into the AVS if appropriate.

## 2024-08-09 RX ORDER — ROSUVASTATIN CALCIUM 20 MG/1
TABLET, COATED ORAL
Qty: 90 TABLET | Refills: 3 | Status: SHIPPED | OUTPATIENT
Start: 2024-08-09

## 2024-08-09 RX ORDER — IRBESARTAN 300 MG/1
TABLET ORAL
Qty: 90 TABLET | Refills: 3 | Status: SHIPPED | OUTPATIENT
Start: 2024-08-09

## 2024-10-25 RX ORDER — CHLORTHALIDONE 25 MG/1
TABLET ORAL
Qty: 90 TABLET | Refills: 3 | Status: SHIPPED | OUTPATIENT
Start: 2024-10-25

## 2025-01-08 ENCOUNTER — LAB (OUTPATIENT)
Dept: LAB | Facility: HOSPITAL | Age: 75
End: 2025-01-08
Payer: MEDICARE

## 2025-01-08 DIAGNOSIS — R74.8 ELEVATED LIVER ENZYMES: ICD-10-CM

## 2025-01-08 DIAGNOSIS — I10 HYPERTENSION, ESSENTIAL: ICD-10-CM

## 2025-01-08 DIAGNOSIS — M17.0 PRIMARY OSTEOARTHRITIS OF BOTH KNEES: ICD-10-CM

## 2025-01-08 DIAGNOSIS — R73.01 IFG (IMPAIRED FASTING GLUCOSE): ICD-10-CM

## 2025-01-08 DIAGNOSIS — E55.9 VITAMIN D DEFICIENCY: ICD-10-CM

## 2025-01-08 DIAGNOSIS — I82.462 DEEP VEIN THROMBOSIS (DVT) OF CALF MUSCLE VEIN OF LEFT LOWER EXTREMITY, UNSPECIFIED CHRONICITY: ICD-10-CM

## 2025-01-08 DIAGNOSIS — E78.2 MIXED HYPERLIPIDEMIA: ICD-10-CM

## 2025-01-08 DIAGNOSIS — Z12.5 SCREENING PSA (PROSTATE SPECIFIC ANTIGEN): ICD-10-CM

## 2025-01-08 DIAGNOSIS — R97.20 PSA ELEVATION: ICD-10-CM

## 2025-01-08 LAB
ALBUMIN SERPL-MCNC: 4.3 G/DL (ref 3.5–5.2)
ALBUMIN/GLOB SERPL: 1.3 G/DL
ALP SERPL-CCNC: 123 U/L (ref 39–117)
ALT SERPL W P-5'-P-CCNC: 42 U/L (ref 1–41)
ANION GAP SERPL CALCULATED.3IONS-SCNC: 10.7 MMOL/L (ref 5–15)
AST SERPL-CCNC: 35 U/L (ref 1–40)
BASOPHILS # BLD AUTO: 0.04 10*3/MM3 (ref 0–0.2)
BASOPHILS NFR BLD AUTO: 0.6 % (ref 0–1.5)
BILIRUB SERPL-MCNC: 0.5 MG/DL (ref 0–1.2)
BUN SERPL-MCNC: 25 MG/DL (ref 8–23)
BUN/CREAT SERPL: 19.2 (ref 7–25)
CALCIUM SPEC-SCNC: 9.6 MG/DL (ref 8.6–10.5)
CHLORIDE SERPL-SCNC: 104 MMOL/L (ref 98–107)
CHOLEST SERPL-MCNC: 199 MG/DL (ref 0–200)
CO2 SERPL-SCNC: 24.3 MMOL/L (ref 22–29)
CREAT SERPL-MCNC: 1.3 MG/DL (ref 0.76–1.27)
DEPRECATED RDW RBC AUTO: 46.3 FL (ref 37–54)
EGFRCR SERPLBLD CKD-EPI 2021: 58 ML/MIN/1.73
EOSINOPHIL # BLD AUTO: 0.4 10*3/MM3 (ref 0–0.4)
EOSINOPHIL NFR BLD AUTO: 6.4 % (ref 0.3–6.2)
ERYTHROCYTE [DISTWIDTH] IN BLOOD BY AUTOMATED COUNT: 13 % (ref 12.3–15.4)
GLOBULIN UR ELPH-MCNC: 3.4 GM/DL
GLUCOSE SERPL-MCNC: 97 MG/DL (ref 65–99)
HBA1C MFR BLD: 6 % (ref 4.8–5.6)
HCT VFR BLD AUTO: 40.7 % (ref 37.5–51)
HDLC SERPL-MCNC: 45 MG/DL (ref 40–60)
HGB BLD-MCNC: 13.3 G/DL (ref 13–17.7)
IMM GRANULOCYTES # BLD AUTO: 0.01 10*3/MM3 (ref 0–0.05)
IMM GRANULOCYTES NFR BLD AUTO: 0.2 % (ref 0–0.5)
LDLC SERPL CALC-MCNC: 114 MG/DL (ref 0–100)
LDLC/HDLC SERPL: 2.39 {RATIO}
LYMPHOCYTES # BLD AUTO: 1.26 10*3/MM3 (ref 0.7–3.1)
LYMPHOCYTES NFR BLD AUTO: 20.1 % (ref 19.6–45.3)
MCH RBC QN AUTO: 31.4 PG (ref 26.6–33)
MCHC RBC AUTO-ENTMCNC: 32.7 G/DL (ref 31.5–35.7)
MCV RBC AUTO: 96 FL (ref 79–97)
MONOCYTES # BLD AUTO: 0.69 10*3/MM3 (ref 0.1–0.9)
MONOCYTES NFR BLD AUTO: 11 % (ref 5–12)
NEUTROPHILS NFR BLD AUTO: 3.88 10*3/MM3 (ref 1.7–7)
NEUTROPHILS NFR BLD AUTO: 61.7 % (ref 42.7–76)
NRBC BLD AUTO-RTO: 0 /100 WBC (ref 0–0.2)
PLATELET # BLD AUTO: 178 10*3/MM3 (ref 140–450)
PMV BLD AUTO: 9.5 FL (ref 6–12)
POTASSIUM SERPL-SCNC: 4.4 MMOL/L (ref 3.5–5.2)
PROT SERPL-MCNC: 7.7 G/DL (ref 6–8.5)
PSA SERPL-MCNC: 3.57 NG/ML (ref 0–4)
RBC # BLD AUTO: 4.24 10*6/MM3 (ref 4.14–5.8)
SODIUM SERPL-SCNC: 139 MMOL/L (ref 136–145)
TRIGL SERPL-MCNC: 232 MG/DL (ref 0–150)
VLDLC SERPL-MCNC: 40 MG/DL (ref 5–40)
WBC NRBC COR # BLD AUTO: 6.28 10*3/MM3 (ref 3.4–10.8)

## 2025-01-08 PROCEDURE — 80061 LIPID PANEL: CPT

## 2025-01-08 PROCEDURE — 36415 COLL VENOUS BLD VENIPUNCTURE: CPT

## 2025-01-08 PROCEDURE — 85025 COMPLETE CBC W/AUTO DIFF WBC: CPT

## 2025-01-08 PROCEDURE — 80053 COMPREHEN METABOLIC PANEL: CPT

## 2025-01-08 PROCEDURE — G0103 PSA SCREENING: HCPCS

## 2025-01-08 PROCEDURE — 83036 HEMOGLOBIN GLYCOSYLATED A1C: CPT

## 2025-01-14 ENCOUNTER — OFFICE VISIT (OUTPATIENT)
Dept: INTERNAL MEDICINE | Age: 75
End: 2025-01-14
Payer: MEDICARE

## 2025-01-14 VITALS
WEIGHT: 247 LBS | DIASTOLIC BLOOD PRESSURE: 71 MMHG | BODY MASS INDEX: 33.46 KG/M2 | HEIGHT: 72 IN | TEMPERATURE: 98.4 F | SYSTOLIC BLOOD PRESSURE: 141 MMHG | HEART RATE: 66 BPM | OXYGEN SATURATION: 92 %

## 2025-01-14 DIAGNOSIS — Z00.00 MEDICARE ANNUAL WELLNESS VISIT, SUBSEQUENT: Primary | ICD-10-CM

## 2025-01-14 DIAGNOSIS — R73.01 IFG (IMPAIRED FASTING GLUCOSE): ICD-10-CM

## 2025-01-14 DIAGNOSIS — E55.9 VITAMIN D DEFICIENCY: ICD-10-CM

## 2025-01-14 DIAGNOSIS — E78.2 MIXED HYPERLIPIDEMIA: ICD-10-CM

## 2025-01-14 DIAGNOSIS — I10 HYPERTENSION, ESSENTIAL: ICD-10-CM

## 2025-01-14 DIAGNOSIS — R74.8 ELEVATED LIVER ENZYMES: ICD-10-CM

## 2025-01-14 DIAGNOSIS — M17.0 PRIMARY OSTEOARTHRITIS OF BOTH KNEES: ICD-10-CM

## 2025-01-14 DIAGNOSIS — R97.20 PSA ELEVATION: ICD-10-CM

## 2025-01-14 DIAGNOSIS — I82.462 DEEP VEIN THROMBOSIS (DVT) OF CALF MUSCLE VEIN OF LEFT LOWER EXTREMITY, UNSPECIFIED CHRONICITY: ICD-10-CM

## 2025-01-14 DIAGNOSIS — Z12.5 SCREENING PSA (PROSTATE SPECIFIC ANTIGEN): ICD-10-CM

## 2025-01-14 PROCEDURE — G0439 PPPS, SUBSEQ VISIT: HCPCS | Performed by: INTERNAL MEDICINE

## 2025-01-14 PROCEDURE — 3077F SYST BP >= 140 MM HG: CPT | Performed by: INTERNAL MEDICINE

## 2025-01-14 PROCEDURE — 1170F FXNL STATUS ASSESSED: CPT | Performed by: INTERNAL MEDICINE

## 2025-01-14 PROCEDURE — 99214 OFFICE O/P EST MOD 30 MIN: CPT | Performed by: INTERNAL MEDICINE

## 2025-01-14 PROCEDURE — 1126F AMNT PAIN NOTED NONE PRSNT: CPT | Performed by: INTERNAL MEDICINE

## 2025-01-14 PROCEDURE — 3078F DIAST BP <80 MM HG: CPT | Performed by: INTERNAL MEDICINE

## 2025-01-14 NOTE — PROGRESS NOTES
Subjective   The ABCs of the Annual Wellness Visit  Medicare Wellness Visit      Herb Dimas is a 74 y.o. patient who presents for a Medicare Wellness Visit.    The following portions of the patient's history were reviewed and   updated as appropriate: allergies, current medications, past family history, past medical history, past social history, past surgical history, and problem list.    Compared to one year ago, the patient's physical   health is the same.  Compared to one year ago, the patient's mental   health is the same.    Recent Hospitalizations:  He was not admitted to the hospital during the last year.     Current Medical Providers:  Patient Care Team:  Clemente Park MD as PCP - General (Internal Medicine)  Casimiro April, APRALEKSEY as Nurse Practitioner (Nurse Practitioner)    Outpatient Medications Prior to Visit   Medication Sig Dispense Refill    amLODIPine (NORVASC) 5 MG tablet TAKE 1 TABLET AT BEDTIME 90 tablet 3    ASPIRIN 81 PO       chlorthalidone (HYGROTON) 25 MG tablet TAKE 1 TABLET EVERY DAY 90 tablet 3    cholecalciferol (VITAMIN D3) 25 MCG (1000 UT) tablet Take 1 tablet by mouth Every Night.      irbesartan (AVAPRO) 300 MG tablet TAKE 1 TABLET EVERY DAY 90 tablet 3    multivitamin with minerals tablet tablet Take 1 tablet by mouth Every Night.      rosuvastatin (CRESTOR) 20 MG tablet TAKE 1 TABLET AT BEDTIME 90 tablet 3    sildenafil (Viagra) 50 MG tablet Take 1 tablet by mouth Daily As Needed for Erectile Dysfunction. 10 tablet 3    vitamin C (ASCORBIC ACID) 250 MG tablet Take 2 tablets by mouth Every Night.       No facility-administered medications prior to visit.     No opioid medication identified on active medication list. I have reviewed chart for other potential  high risk medication/s and harmful drug interactions in the elderly.      Aspirin is on active medication list. Aspirin use is indicated based on review of current medical condition/s. Pros and cons of this therapy  "have been discussed today. Benefits of this medication outweigh potential harm.  Patient has been encouraged to continue taking this medication.  .      Patient Active Problem List   Diagnosis    Vitamin D deficiency    Hypertension, essential    Elevated liver enzymes    Mixed hyperlipidemia    Deep vein thrombosis (DVT) of calf muscle vein of left lower extremity    Primary osteoarthritis of both knees    Screening PSA (prostate specific antigen)    IFG (impaired fasting glucose)    Right calf pain    Osteoarthrosis, localized, primary, knee, right    PSA elevation    Medicare annual wellness visit, subsequent     Advance Care Planning Advance Directive is on file.  ACP discussion was held with the patient during this visit. Patient has an advance directive in EMR which is still valid.             Objective   Vitals:    01/14/25 1248   BP: 141/71   BP Location: Right arm   Patient Position: Sitting   Pulse: 66   Temp: 98.4 °F (36.9 °C)   SpO2: 92%   Weight: 112 kg (247 lb)   Height: 182.9 cm (72.01\")   PainSc: 0-No pain       Estimated body mass index is 33.49 kg/m² as calculated from the following:    Height as of this encounter: 182.9 cm (72.01\").    Weight as of this encounter: 112 kg (247 lb).    BMI is >= 30 and <35. (Class 1 Obesity). The following options were offered after discussion;: weight loss educational material (shared in after visit summary) and exercise counseling/recommendations           Does the patient have evidence of cognitive impairment? No  Lab Results   Component Value Date    TRIG 232 (H) 01/08/2025    HDL 45 01/08/2025     (H) 01/08/2025    VLDL 40 01/08/2025    HGBA1C 6.00 (H) 01/08/2025                                                                                               Health  Risk Assessment    Smoking Status:  Social History     Tobacco Use   Smoking Status Former    Current packs/day: 0.00    Average packs/day: 0.5 packs/day for 6.8 years (3.4 ttl pk-yrs)    Types: " Cigarettes    Start date: 1970    Quit date: 1976    Years since quittin.1   Smokeless Tobacco Never     Alcohol Consumption:  Social History     Substance and Sexual Activity   Alcohol Use Yes    Alcohol/week: 4.0 standard drinks of alcohol    Types: 4 Cans of beer per week    Comment: Occasionally       Fall Risk Screen  GABINO Fall Risk Assessment was completed, and patient is at LOW risk for falls.Assessment completed on:2025    Depression Screening   Little interest or pleasure in doing things? Not at all   Feeling down, depressed, or hopeless? Not at all   PHQ-2 Total Score 0      Health Habits and Functional and Cognitive Screenin/14/2025    12:00 PM   Functional & Cognitive Status   Do you have difficulty preparing food and eating? No   Do you have difficulty bathing yourself, getting dressed or grooming yourself? No   Do you have difficulty using the toilet? No   Do you have difficulty moving around from place to place? No   Do you have trouble with steps or getting out of a bed or a chair? No   Current Diet Well Balanced Diet   Dental Exam Up to date   Eye Exam Up to date   Exercise (times per week) 7 times per week   Current Exercises Include Walking   Do you need help using the phone?  No   Are you deaf or do you have serious difficulty hearing?  No   Do you need help to go to places out of walking distance? No   Do you need help shopping? No   Do you need help preparing meals?  No   Do you need help with housework?  No   Do you need help with laundry? No   Do you need help taking your medications? No   Do you need help managing money? No   Do you ever drive or ride in a car without wearing a seat belt? No   Have you felt unusual stress, anger or loneliness in the last month? No   Who do you live with? Spouse   If you need help, do you have trouble finding someone available to you? No   Have you been bothered in the last four weeks by sexual problems? No   Do you have  difficulty concentrating, remembering or making decisions? No           Age-appropriate Screening Schedule:  Refer to the list below for future screening recommendations based on patient's age, sex and/or medical conditions. Orders for these recommended tests are listed in the plan section. The patient has been provided with a written plan.    Health Maintenance List  Health Maintenance   Topic Date Due    TDAP/TD VACCINES (1 - Tdap) Never done    ZOSTER VACCINE (1 of 2) Never done    Pneumococcal Vaccine 65+ (1 of 1 - PCV) Never done    BMI FOLLOWUP  08/30/2024    COVID-19 Vaccine (4 - 2024-25 season) 09/01/2024    LIPID PANEL  01/08/2026    ANNUAL WELLNESS VISIT  01/14/2026    COLORECTAL CANCER SCREENING  05/18/2026    HEPATITIS C SCREENING  Completed    INFLUENZA VACCINE  Completed    AAA SCREEN ONCE  Completed    HEMOGLOBIN A1C  Discontinued                                                                                                                                                CMS Preventative Services Quick Reference  Risk Factors Identified During Encounter  None Identified    The above risks/problems have been discussed with the patient.  Pertinent information has been shared with the patient in the After Visit Summary.  An After Visit Summary and PPPS were made available to the patient.    Follow Up:   Next Medicare Wellness visit to be scheduled in 1 year.     Assessment & Plan  Vitamin D deficiency         Hypertension, essential  Hypertension is stable and controlled  Continue current treatment regimen.  Blood pressure will be reassessed in 6 months.         Mixed hyperlipidemia   Lipid abnormalities are stable    Plan:  Continue same medication/s without change.      Discussed medication dosage, use, side effects, and goals of treatment in detail.    Counseled patient on lifestyle modifications to help control hyperlipidemia.     Patient Treatment Goals:   LDL goal is less than 70    Followup in 6  months.         Deep vein thrombosis (DVT) of calf muscle vein of left lower extremity, unspecified chronicity         Medicare annual wellness visit, subsequent         Elevated liver enzymes         PSA elevation         Screening PSA (prostate specific antigen)         Primary osteoarthritis of both knees         IFG (impaired fasting glucose)              Follow Up:   No follow-ups on file.        Answers submitted by the patient for this visit:  Primary Reason for Visit (Submitted on 1/14/2025)  What is the primary reason for your visit?: Problem Not Listed  Problem not listed (Submitted on 1/14/2025)  Chief Complaint: Other medical problem  Reason for appointment: Annual Visit  abdominal pain: No  anorexia: No  joint pain: No  change in stool: No  chest pain: No  chills: No  nasal congestion: No  cough: No  diaphoresis: No  fatigue: No  fever: No  headaches: No  joint swelling: No  myalgias: No  nausea: No  neck pain: No  numbness: No  rash: No  sore throat: No  swollen glands: No  dysuria: No  vertigo: No  visual change: No  vomiting: No  weakness: No

## 2025-01-14 NOTE — PROGRESS NOTES
"CHIEF COMPLAINT/ HPI:  Medicare Wellness-subsequent (Wellness Visit, Lab follow up. Pt states no concerns today. )              Objective   Vital Signs  Vitals:    01/14/25 1248   BP: 141/71   BP Location: Right arm   Patient Position: Sitting   Pulse: 66   Temp: 98.4 °F (36.9 °C)   SpO2: 92%   Weight: 112 kg (247 lb)   Height: 182.9 cm (72.01\")      Body mass index is 33.49 kg/m².  Review of Systems   Constitutional:  Negative for chills, diaphoresis, fatigue and fever.   HENT:  Negative for congestion, sore throat and swollen glands.    Respiratory:  Negative for cough.    Cardiovascular:  Negative for chest pain.   Gastrointestinal:  Negative for abdominal pain, nausea and vomiting.   Genitourinary:  Negative for dysuria.   Musculoskeletal:  Negative for myalgias and neck pain.   Skin:  Negative for rash.   Neurological:  Negative for weakness and numbness.      Physical Exam  Constitutional:       General: He is not in acute distress.     Appearance: Normal appearance.   HENT:      Head: Normocephalic.      Mouth/Throat:      Mouth: Mucous membranes are moist.   Eyes:      Conjunctiva/sclera: Conjunctivae normal.      Pupils: Pupils are equal, round, and reactive to light.   Cardiovascular:      Rate and Rhythm: Normal rate and regular rhythm.      Pulses: Normal pulses.      Heart sounds: Normal heart sounds.   Pulmonary:      Effort: Pulmonary effort is normal.      Breath sounds: Normal breath sounds.   Abdominal:      General: Bowel sounds are normal.      Palpations: Abdomen is soft.   Musculoskeletal:         General: No swelling. Normal range of motion.      Cervical back: Neck supple.   Skin:     General: Skin is warm and dry.      Coloration: Skin is not jaundiced.   Neurological:      General: No focal deficit present.      Mental Status: He is alert and oriented to person, place, and time. Mental status is at baseline.   Psychiatric:         Mood and Affect: Mood normal.         Behavior: Behavior " "normal.         Thought Content: Thought content normal.         Judgment: Judgment normal.        Result Review :   No results found for: \"PROBNP\", \"BNP\"  CMP          4/17/2024    08:33 1/8/2025    07:45   CMP   Glucose 101  97    BUN 27  25    Creatinine 1.27  1.30    EGFR 60.0  58.0    Sodium 139  139    Potassium 4.4  4.4    Chloride 105  104    Calcium 8.2  9.6    Total Protein 7.5  7.7    Albumin 4.5  4.3    Globulin 3.0  3.4    Total Bilirubin 0.5  0.5    Alkaline Phosphatase 87  123    AST (SGOT) 20  35    ALT (SGPT) 29  42    Albumin/Globulin Ratio 1.5  1.3    BUN/Creatinine Ratio 21.3  19.2    Anion Gap 10.0  10.7      CBC w/diff          4/17/2024    08:33 1/8/2025    07:45   CBC w/Diff   WBC 5.70  6.28    RBC 4.32  4.24    Hemoglobin 13.4  13.3    Hematocrit 40.2  40.7    MCV 93.1  96.0    MCH 31.0  31.4    MCHC 33.3  32.7    RDW 13.3  13.0    Platelets 172  178    Neutrophil Rel % 60.1  61.7    Immature Granulocyte Rel % 0.2  0.2    Lymphocyte Rel % 25.8  20.1    Monocyte Rel % 9.5  11.0    Eosinophil Rel % 3.9  6.4    Basophil Rel % 0.5  0.6       Lipid Panel          4/17/2024    08:33 1/8/2025    07:45   Lipid Panel   Total Cholesterol 177  199    Triglycerides 220  232    HDL Cholesterol 46  45    VLDL Cholesterol 37  40    LDL Cholesterol  94  114    LDL/HDL Ratio 1.89  2.39       Lab Results   Component Value Date    TSH 2.420 01/07/2019      No results found for: \"FREET4\"   A1C Last 3 Results          4/17/2024    08:33 1/8/2025    07:45   HGBA1C Last 3 Results   Hemoglobin A1C 5.80  6.00       PSA          1/8/2025    07:45   PSA   PSA 3.570                     Visit Diagnoses:    ICD-10-CM ICD-9-CM   1. Medicare annual wellness visit, subsequent  Z00.00 V70.0   2. Vitamin D deficiency  E55.9 268.9   3. Hypertension, essential  I10 401.9   4. Mixed hyperlipidemia  E78.2 272.2   5. Deep vein thrombosis (DVT) of calf muscle vein of left lower extremity, unspecified chronicity  I82.462 453.42   6. " Elevated liver enzymes  R74.8 790.5   7. PSA elevation  R97.20 790.93   8. Screening PSA (prostate specific antigen)  Z12.5 V76.44   9. Primary osteoarthritis of both knees  M17.0 715.16   10. IFG (impaired fasting glucose)  R73.01 790.21       Assessment and Plan   Diagnoses and all orders for this visit:    1. Medicare annual wellness visit, subsequent (Primary)  Assessment & Plan:           Orders:  -     PSA DIAGNOSTIC; Future  -     Basic Metabolic Panel; Future  -     Hemoglobin A1c; Future    2. Vitamin D deficiency  Assessment & Plan:           Orders:  -     PSA DIAGNOSTIC; Future  -     Basic Metabolic Panel; Future  -     Hemoglobin A1c; Future    3. Hypertension, essential  Assessment & Plan:  Hypertension is stable and controlled  Continue current treatment regimen.  Blood pressure will be reassessed in 6 months.           Orders:  -     PSA DIAGNOSTIC; Future  -     Basic Metabolic Panel; Future  -     Hemoglobin A1c; Future    4. Mixed hyperlipidemia  Assessment & Plan:   Lipid abnormalities are stable    Plan:  Continue same medication/s without change.      Discussed medication dosage, use, side effects, and goals of treatment in detail.    Counseled patient on lifestyle modifications to help control hyperlipidemia.     Patient Treatment Goals:   LDL goal is less than 70    Followup in 6 months.           Orders:  -     PSA DIAGNOSTIC; Future  -     Basic Metabolic Panel; Future  -     Hemoglobin A1c; Future    5. Deep vein thrombosis (DVT) of calf muscle vein of left lower extremity, unspecified chronicity  Assessment & Plan:           Orders:  -     PSA DIAGNOSTIC; Future  -     Basic Metabolic Panel; Future  -     Hemoglobin A1c; Future    6. Elevated liver enzymes  Assessment & Plan:           Orders:  -     PSA DIAGNOSTIC; Future  -     Basic Metabolic Panel; Future  -     Hemoglobin A1c; Future    7. PSA elevation  Assessment & Plan:           Orders:  -     PSA DIAGNOSTIC; Future  -     Basic  Metabolic Panel; Future  -     Hemoglobin A1c; Future    8. Screening PSA (prostate specific antigen)  Assessment & Plan:           Orders:  -     PSA DIAGNOSTIC; Future  -     Basic Metabolic Panel; Future  -     Hemoglobin A1c; Future    9. Primary osteoarthritis of both knees  Assessment & Plan:           Orders:  -     PSA DIAGNOSTIC; Future  -     Basic Metabolic Panel; Future  -     Hemoglobin A1c; Future    10. IFG (impaired fasting glucose)  Assessment & Plan:           Orders:  -     PSA DIAGNOSTIC; Future  -     Basic Metabolic Panel; Future  -     Hemoglobin A1c; Future        Medicare wellness questions completed January 14, 2025     right total knee arthroplasty with postop infection, treated improved February 2023,    dvt remote 2010, leg left after left tka ,     Elevated PSA down to 2.5 August 2023--- did see urology Dr. Larose at some point previously, PSA at 3.5 January 2025 would follow-up again in 6 months to 1 year    HTN , CONT IRBESARTAN 300 MG QD, CHLORTHALIDONE 25 MG QD , AMLODIPINE 5 MG QHS     Elevated creatinine slightly, most likely due to volume issues diuretics blood pressure etc. continue drinking plenty of fluids will follow    Impaired fasting glucose, up to 6.0 January 2025    Mixed hyperlipidemia continues  Crestor 20 mg daily,    Prior colonoscopy 2018 Dr. Erwin 2 polyps, and May 2021 Dr. Braswell, 4 mm polyp ascending colon internal hemorrhoids and diverticuli otherwise unremarkable,    Previous laparoscopic appendectomy February 2018 for acute appendicitis    Previous left total knee arthroplasty 2010    Vitamin D deficiency---CONT OTC REPLACEMENT          Follow Up   Return in about 6 months (around 7/14/2025).  Patient was given instructions and counseling regarding his condition or for health maintenance advice. Please see specific information pulled into the AVS if appropriate.           Answers submitted by the patient for this visit:  Primary Reason for Visit (Submitted on  1/14/2025)  What is the primary reason for your visit?: Problem Not Listed  Problem not listed (Submitted on 1/14/2025)  Chief Complaint: Other medical problem  Reason for appointment: Annual Visit  anorexia: No  joint pain: No  change in stool: No  headaches: No  joint swelling: No  vertigo: No  visual change: No

## 2025-04-14 RX ORDER — AMLODIPINE BESYLATE 5 MG/1
5 TABLET ORAL
Qty: 90 TABLET | Refills: 3 | Status: SHIPPED | OUTPATIENT
Start: 2025-04-14

## 2025-05-14 ENCOUNTER — OFFICE VISIT (OUTPATIENT)
Dept: INTERNAL MEDICINE | Age: 75
End: 2025-05-14
Payer: MEDICARE

## 2025-05-14 VITALS
TEMPERATURE: 98.3 F | OXYGEN SATURATION: 95 % | DIASTOLIC BLOOD PRESSURE: 68 MMHG | WEIGHT: 238 LBS | HEART RATE: 62 BPM | HEIGHT: 72 IN | SYSTOLIC BLOOD PRESSURE: 136 MMHG | RESPIRATION RATE: 18 BRPM | BODY MASS INDEX: 32.23 KG/M2

## 2025-05-14 DIAGNOSIS — K13.70 ORAL MUCOSAL LESION: Primary | ICD-10-CM

## 2025-05-14 PROCEDURE — 99214 OFFICE O/P EST MOD 30 MIN: CPT | Performed by: NURSE PRACTITIONER

## 2025-05-14 PROCEDURE — 1126F AMNT PAIN NOTED NONE PRSNT: CPT | Performed by: NURSE PRACTITIONER

## 2025-05-14 PROCEDURE — 3075F SYST BP GE 130 - 139MM HG: CPT | Performed by: NURSE PRACTITIONER

## 2025-05-14 PROCEDURE — 3078F DIAST BP <80 MM HG: CPT | Performed by: NURSE PRACTITIONER

## 2025-05-14 RX ORDER — CEPHALEXIN 500 MG/1
500 CAPSULE ORAL 3 TIMES DAILY
Qty: 21 CAPSULE | Refills: 0 | Status: SHIPPED | OUTPATIENT
Start: 2025-05-14 | End: 2025-05-21

## 2025-05-14 NOTE — PROGRESS NOTES
Answers submitted by the patient for this visit:  Problem not listed (Submitted on 5/14/2025)  Chief Complaint: Other medical problem  Reason for appointment: Black spot on inside of cheek  abdominal pain: No  anorexia: No  joint pain: No  change in stool: No  chest pain: No  chills: No  nasal congestion: No  cough: No  diaphoresis: No  fatigue: No  fever: No  headaches: No  joint swelling: No  myalgias: No  nausea: No  neck pain: No  numbness: No  rash: No  sore throat: No  swollen glands: No  dysuria: No  vertigo: No  visual change: No  vomiting: No  weakness: No  Onset: in the past 7 days  Chronicity: new  Frequency: constantly  Medications tried: None  Chief Complaint  Black Spot (74 year old male here today for a black spot inside of his cheek that he believes is a tick. Pt states he noticed it yesterday )    Subjective      History of Present Illness  The patient is a 74-year-old male, a patient of Dr. Park, who comes in for an acute visit today.  He is accompanied by his wife    He reports the sudden appearance of a lesion on the inside of his cheek, which he first noticed yesterday morning. The lesion was initially black in color and resembled a popcorn kernel. His wife, a retired nurse, attempted to remove it with tweezers, resulting in minor bleeding. Today, the lesion appears to have changed to a brownish hue, similar to a tick, and has increased in size. It is described as round and red, with occasional minor bleeding upon manipulation. He reports no pain associated with the lesion, except when it is poked. He also mentions that he sleeps on his side and wonders if his teeth may have come into contact with the lesion during sleep.    PAST SURGICAL HISTORY:   - Knee replacement with subsequent immobilization due to a torn quad tendon.       Past Medical History:   Diagnosis Date    Arthritis     Deep vein thrombosis 2010    Following knee surgery    High blood pressure     High cholesterol     Knee  swelling     Right knee pain         Past Surgical History:   Procedure Laterality Date    APPENDECTOMY      BURSECTOMY Right     elbow    CARDIAC CATHETERIZATION      COLONOSCOPY      JOINT REPLACEMENT  24    REPLACEMENT TOTAL KNEE Left 2010    TOTAL KNEE ARTHROPLASTY Right 2023    Procedure: TOTAL KNEE ARTHROPLASTY WITH SHARATH ROBOT;  Surgeon: Malcolm Ceja MD;  Location: [facility] MAIN OR;  Service: Robotics - Ortho;  Laterality: Right;        Social History     Tobacco Use   Smoking Status Former    Current packs/day: 0.00    Average packs/day: 0.5 packs/day for 6.8 years (3.4 ttl pk-yrs)    Types: Cigarettes    Start date: 1970    Quit date: 1976    Years since quittin.4    Passive exposure: Past   Smokeless Tobacco Never        Patient Care Team:  Clemente Park MD as PCP - General (Internal Medicine)  Casimiro as Nurse Practitioner (Nurse Practitioner)    No Known Allergies       Current Outpatient Medications:     amLODIPine (NORVASC) 5 MG tablet, TAKE 1 TABLET AT BEDTIME, Disp: 90 tablet, Rfl: 3    ASPIRIN 81 PO, , Disp: , Rfl:     chlorthalidone (HYGROTON) 25 MG tablet, TAKE 1 TABLET EVERY DAY, Disp: 90 tablet, Rfl: 3    cholecalciferol (VITAMIN D3) 25 MCG (1000 UT) tablet, Take 1 tablet by mouth Every Night., Disp: , Rfl:     irbesartan (AVAPRO) 300 MG tablet, TAKE 1 TABLET EVERY DAY, Disp: 90 tablet, Rfl: 3    multivitamin with minerals tablet tablet, Take 1 tablet by mouth Every Night., Disp: , Rfl:     rosuvastatin (CRESTOR) 20 MG tablet, TAKE 1 TABLET AT BEDTIME, Disp: 90 tablet, Rfl: 3    sildenafil (Viagra) 50 MG tablet, Take 1 tablet by mouth Daily As Needed for Erectile Dysfunction., Disp: 10 tablet, Rfl: 3    valACYclovir (VALTREX) 1000 MG tablet, Take 1 tablet by mouth Every 12 (Twelve) Hours., Disp: , Rfl:     vitamin C (ASCORBIC ACID) 250 MG tablet, Take 2 tablets by mouth Every Night., Disp: , Rfl:     benzonatate (TESSALON) 100 MG  "capsule, Take 1 capsule by mouth 3 (Three) Times a Day As Needed for Cough. (Patient not taking: Reported on 5/14/2025), Disp: 30 capsule, Rfl: 0    cephalexin (KEFLEX) 500 MG capsule, Take 1 capsule by mouth 3 (Three) Times a Day for 7 days., Disp: 21 capsule, Rfl: 0    Objective     Vitals:    05/14/25 1445   BP: 136/68   BP Location: Left arm   Patient Position: Sitting   Cuff Size: Large Adult   Pulse: 62   Resp: 18   Temp: 98.3 °F (36.8 °C)   TempSrc: Temporal   SpO2: 95%   Weight: 108 kg (238 lb)   Height: 182.9 cm (72.01\")        Wt Readings from Last 3 Encounters:   05/14/25 108 kg (238 lb)   02/18/25 110 kg (242 lb 14.4 oz)   01/14/25 112 kg (247 lb)        BP Readings from Last 3 Encounters:   05/14/25 136/68   02/18/25 145/84   01/14/25 141/71        Physical Exam  Mouth/Throat: Lesion on the inside of the cheek, initially black, now brownish and red, slightly bleeding upon manipulation.  Area has now disappeared and bleeding ceased.  No significant pain reported.        Assessment and Plan     Diagnoses and all orders for this visit:    1. Oral mucosal lesion (Primary)    Other orders  -     cephalexin (KEFLEX) 500 MG capsule; Take 1 capsule by mouth 3 (Three) Times a Day for 7 days.  Dispense: 21 capsule; Refill: 0         Assessment & Plan  1. Lesion on the inner cheek.  - The lesion appeared suddenly on 05/13/2025 and was initially black, later turning brownish.  - Physical examination revealed a small, dark lesion on the inner cheek, which bled slightly upon manipulation.  - The lesion was excised, and it was noted to resemble a blood blister. No significant tissue was found upon removal.  - Keflex 500 mg prescribed, to be taken three times daily for 7 days as a prophylaxis. The patient was advised to monitor the area and consult an oral surgeon if the lesion recurs.    PROCEDURE  Procedure: Excision of lesion on inner cheek    All questions were answered and agreement to proceed was given after the " following Pre-Procedure details were reviewed:  - Risks and Benefits: Potential for minor bleeding, infection, and need for further treatment if the lesion is found to be malignant. Benefits include removal of the lesion and alleviation of discomfort.  - Alternative Options: Monitoring the lesion for changes, referral to an oral surgeon.  - Side effects: Minor bleeding, potential for infection, discomfort at the site of excision.  - Consent: Verbal consent obtained for the procedure.    Intra-Procedure:  - Site Preparation: The area was cleaned and prepared for excision.  - Anesthesia: Lidocaine was administered to numb the area.  - Medication: Lidocaine for local anesthesia.  - Hemostasis: Pressure applied to control minor bleeding; silver nitrate sticks were sought but not available.  - Dressing: Gauze applied to the site to manage minor bleeding.    Post-Procedure:  - Tolerance Level: Patient tolerated the procedure well.  - Complications: Minor bleeding controlled with gauze.  - Home Care Instructions: Monitor the site for signs of infection, avoid irritating the area, and follow up with an oral surgeon if any issues arise. Prescription for Keflex (cephalexin) provided to prevent infection.     Patient was given instructions and counseling regarding his condition or for health maintenance advice. Please see specific information pulled into the AVS if appropriate.     Follow Up   No follow-ups on file.    Patient or patient representative verbalized consent for the use of Ambient Listening during the visit with  GLORIA Lainez for chart documentation. 5/14/2025  14:58 EDT    GLORIA Lainez

## 2025-06-28 NOTE — TELEPHONE ENCOUNTER
CALLED AND LEFT A MESSAGE TO CALL ME AT THE OFFICE.  PER DR ISLAS PATIENT CAN HOLD/STOP ASPIRIN FOR 7 DAYS PRIOR TO SURGERY.    
PATIENT CALLED AND VOICES UNDERSTANDING TO HOLD ASPIRIN  
I do not need any legal help

## 2025-07-07 RX ORDER — ROSUVASTATIN CALCIUM 20 MG/1
20 TABLET, COATED ORAL
Qty: 90 TABLET | Refills: 3 | Status: SHIPPED | OUTPATIENT
Start: 2025-07-07

## 2025-07-07 RX ORDER — IRBESARTAN 300 MG/1
300 TABLET ORAL DAILY
Qty: 90 TABLET | Refills: 3 | Status: SHIPPED | OUTPATIENT
Start: 2025-07-07

## 2025-07-09 ENCOUNTER — LAB (OUTPATIENT)
Dept: LAB | Facility: HOSPITAL | Age: 75
End: 2025-07-09
Payer: MEDICARE

## 2025-07-09 DIAGNOSIS — I82.462 DEEP VEIN THROMBOSIS (DVT) OF CALF MUSCLE VEIN OF LEFT LOWER EXTREMITY, UNSPECIFIED CHRONICITY: ICD-10-CM

## 2025-07-09 DIAGNOSIS — R73.01 IFG (IMPAIRED FASTING GLUCOSE): ICD-10-CM

## 2025-07-09 DIAGNOSIS — M17.0 PRIMARY OSTEOARTHRITIS OF BOTH KNEES: ICD-10-CM

## 2025-07-09 DIAGNOSIS — R97.20 PSA ELEVATION: ICD-10-CM

## 2025-07-09 DIAGNOSIS — Z12.5 SCREENING PSA (PROSTATE SPECIFIC ANTIGEN): ICD-10-CM

## 2025-07-09 DIAGNOSIS — E55.9 VITAMIN D DEFICIENCY: ICD-10-CM

## 2025-07-09 DIAGNOSIS — E78.2 MIXED HYPERLIPIDEMIA: ICD-10-CM

## 2025-07-09 DIAGNOSIS — R74.8 ELEVATED LIVER ENZYMES: ICD-10-CM

## 2025-07-09 DIAGNOSIS — Z00.00 MEDICARE ANNUAL WELLNESS VISIT, SUBSEQUENT: ICD-10-CM

## 2025-07-09 DIAGNOSIS — I10 HYPERTENSION, ESSENTIAL: ICD-10-CM

## 2025-07-09 LAB
ANION GAP SERPL CALCULATED.3IONS-SCNC: 11.7 MMOL/L (ref 5–15)
BUN SERPL-MCNC: 27 MG/DL (ref 8–23)
BUN/CREAT SERPL: 22.5 (ref 7–25)
CALCIUM SPEC-SCNC: 9.8 MG/DL (ref 8.6–10.5)
CHLORIDE SERPL-SCNC: 102 MMOL/L (ref 98–107)
CO2 SERPL-SCNC: 23.3 MMOL/L (ref 22–29)
CREAT SERPL-MCNC: 1.2 MG/DL (ref 0.76–1.27)
EGFRCR SERPLBLD CKD-EPI 2021: 63.9 ML/MIN/1.73
GLUCOSE SERPL-MCNC: 97 MG/DL (ref 65–99)
HBA1C MFR BLD: 6.2 % (ref 4.8–5.6)
POTASSIUM SERPL-SCNC: 4.3 MMOL/L (ref 3.5–5.2)
PSA SERPL-MCNC: 7.06 NG/ML (ref 0–4)
SODIUM SERPL-SCNC: 137 MMOL/L (ref 136–145)

## 2025-07-09 PROCEDURE — 83036 HEMOGLOBIN GLYCOSYLATED A1C: CPT

## 2025-07-09 PROCEDURE — 36415 COLL VENOUS BLD VENIPUNCTURE: CPT

## 2025-07-09 PROCEDURE — 84153 ASSAY OF PSA TOTAL: CPT

## 2025-07-09 PROCEDURE — 80048 BASIC METABOLIC PNL TOTAL CA: CPT

## 2025-07-14 ENCOUNTER — OFFICE VISIT (OUTPATIENT)
Dept: INTERNAL MEDICINE | Age: 75
End: 2025-07-14
Payer: MEDICARE

## 2025-07-14 VITALS
WEIGHT: 234.4 LBS | SYSTOLIC BLOOD PRESSURE: 138 MMHG | DIASTOLIC BLOOD PRESSURE: 75 MMHG | HEIGHT: 72 IN | OXYGEN SATURATION: 93 % | HEART RATE: 57 BPM | TEMPERATURE: 98 F | BODY MASS INDEX: 31.75 KG/M2

## 2025-07-14 DIAGNOSIS — R73.01 IFG (IMPAIRED FASTING GLUCOSE): ICD-10-CM

## 2025-07-14 DIAGNOSIS — I82.462 DEEP VEIN THROMBOSIS (DVT) OF CALF MUSCLE VEIN OF LEFT LOWER EXTREMITY, UNSPECIFIED CHRONICITY: ICD-10-CM

## 2025-07-14 DIAGNOSIS — E55.9 VITAMIN D DEFICIENCY: ICD-10-CM

## 2025-07-14 DIAGNOSIS — R74.8 ELEVATED LIVER ENZYMES: ICD-10-CM

## 2025-07-14 DIAGNOSIS — E78.2 MIXED HYPERLIPIDEMIA: ICD-10-CM

## 2025-07-14 DIAGNOSIS — R97.20 PSA ELEVATION: Primary | ICD-10-CM

## 2025-07-14 DIAGNOSIS — I10 HYPERTENSION, ESSENTIAL: ICD-10-CM

## 2025-07-14 PROCEDURE — 99214 OFFICE O/P EST MOD 30 MIN: CPT | Performed by: INTERNAL MEDICINE

## 2025-07-14 PROCEDURE — G2211 COMPLEX E/M VISIT ADD ON: HCPCS | Performed by: INTERNAL MEDICINE

## 2025-07-14 PROCEDURE — 3075F SYST BP GE 130 - 139MM HG: CPT | Performed by: INTERNAL MEDICINE

## 2025-07-14 PROCEDURE — 3078F DIAST BP <80 MM HG: CPT | Performed by: INTERNAL MEDICINE

## 2025-07-14 PROCEDURE — 1126F AMNT PAIN NOTED NONE PRSNT: CPT | Performed by: INTERNAL MEDICINE

## 2025-07-14 RX ORDER — DOXYCYCLINE 100 MG/1
100 CAPSULE ORAL 2 TIMES DAILY
Qty: 28 CAPSULE | Refills: 0 | Status: SHIPPED | OUTPATIENT
Start: 2025-07-14 | End: 2025-07-24

## 2025-07-14 NOTE — PROGRESS NOTES
"Chief Complaint   Patient presents with    Follow-up     73 yo male presents for routine 6 month f/u. Pt has had labs. No new issues.    Hypertension        Objective   Vital Signs  Vitals:    07/14/25 1259   BP: 138/75   BP Location: Right arm   Patient Position: Sitting   Cuff Size: Adult   Pulse: 57   Temp: 98 °F (36.7 °C)   TempSrc: Skin   SpO2: 93%   Weight: 106 kg (234 lb 6.4 oz)   Height: 182.9 cm (72\")      Body mass index is 31.79 kg/m².  Review of Systems   Constitutional: Negative.    HENT: Negative.     Eyes: Negative.    Respiratory: Negative.     Cardiovascular: Negative.    Gastrointestinal: Negative.    Endocrine: Negative.    Genitourinary: Negative.    Musculoskeletal: Negative.    Allergic/Immunologic: Negative.    Neurological: Negative.    Hematological: Negative.    Psychiatric/Behavioral: Negative.        Physical Exam  Constitutional:       General: He is not in acute distress.     Appearance: Normal appearance.   HENT:      Head: Normocephalic.      Mouth/Throat:      Mouth: Mucous membranes are moist.   Eyes:      Conjunctiva/sclera: Conjunctivae normal.      Pupils: Pupils are equal, round, and reactive to light.   Cardiovascular:      Rate and Rhythm: Normal rate and regular rhythm.      Pulses: Normal pulses.      Heart sounds: Normal heart sounds.   Pulmonary:      Effort: Pulmonary effort is normal.      Breath sounds: Normal breath sounds.   Abdominal:      General: Abdomen is flat. Bowel sounds are normal.      Palpations: Abdomen is soft.   Musculoskeletal:         General: No swelling. Normal range of motion.      Cervical back: Neck supple.   Skin:     General: Skin is warm and dry.      Coloration: Skin is not jaundiced.   Neurological:      General: No focal deficit present.      Mental Status: He is alert and oriented to person, place, and time. Mental status is at baseline.   Psychiatric:         Mood and Affect: Mood normal.         Behavior: Behavior normal.         Thought " "Content: Thought content normal.         Judgment: Judgment normal.        Result Review :   No results found for: \"PROBNP\", \"BNP\"  CMP          1/8/2025    07:45 7/9/2025    07:51   CMP   Glucose 97  97    BUN 25  27.0    Creatinine 1.30  1.20    EGFR 58.0  63.9    Sodium 139  137    Potassium 4.4  4.3    Chloride 104  102    Calcium 9.6  9.8    Total Protein 7.7     Albumin 4.3     Globulin 3.4     Total Bilirubin 0.5     Alkaline Phosphatase 123     AST (SGOT) 35     ALT (SGPT) 42     Albumin/Globulin Ratio 1.3     BUN/Creatinine Ratio 19.2  22.5    Anion Gap 10.7  11.7      CBC w/diff          1/8/2025    07:45   CBC w/Diff   WBC 6.28    RBC 4.24    Hemoglobin 13.3    Hematocrit 40.7    MCV 96.0    MCH 31.4    MCHC 32.7    RDW 13.0    Platelets 178    Neutrophil Rel % 61.7    Immature Granulocyte Rel % 0.2    Lymphocyte Rel % 20.1    Monocyte Rel % 11.0    Eosinophil Rel % 6.4    Basophil Rel % 0.6       Lipid Panel          1/8/2025    07:45   Lipid Panel   Total Cholesterol 199    Triglycerides 232    HDL Cholesterol 45    VLDL Cholesterol 40    LDL Cholesterol  114    LDL/HDL Ratio 2.39       Lab Results   Component Value Date    TSH 2.420 01/07/2019      No results found for: \"FREET4\"   A1C Last 3 Results          1/8/2025    07:45 7/9/2025    07:51   HGBA1C Last 3 Results   Hemoglobin A1C 6.00  6.20       PSA          1/8/2025    07:45 7/9/2025    07:51   PSA   PSA 3.570  7.060                     Visit Diagnoses:    ICD-10-CM ICD-9-CM   1. PSA elevation  R97.20 790.93   2. IFG (impaired fasting glucose)  R73.01 790.21   3. Mixed hyperlipidemia  E78.2 272.2   4. Deep vein thrombosis (DVT) of calf muscle vein of left lower extremity, unspecified chronicity  I82.462 453.42   5. Elevated liver enzymes  R74.8 790.5   6. Vitamin D deficiency  E55.9 268.9   7. Hypertension, essential  I10 401.9       Assessment and Plan   Diagnoses and all orders for this visit:    1. PSA elevation (Primary)  -     PSA " DIAGNOSTIC; Future  -     CBC & Differential; Future  -     Comprehensive Metabolic Panel; Future  -     Hemoglobin A1c; Future  -     PSA DIAGNOSTIC; Future  -     Lipid Panel; Future    2. IFG (impaired fasting glucose)  -     PSA DIAGNOSTIC; Future  -     CBC & Differential; Future  -     Comprehensive Metabolic Panel; Future  -     Hemoglobin A1c; Future  -     PSA DIAGNOSTIC; Future  -     Lipid Panel; Future    3. Mixed hyperlipidemia  -     PSA DIAGNOSTIC; Future  -     CBC & Differential; Future  -     Comprehensive Metabolic Panel; Future  -     Hemoglobin A1c; Future  -     PSA DIAGNOSTIC; Future  -     Lipid Panel; Future    4. Deep vein thrombosis (DVT) of calf muscle vein of left lower extremity, unspecified chronicity  -     PSA DIAGNOSTIC; Future  -     CBC & Differential; Future  -     Comprehensive Metabolic Panel; Future  -     Hemoglobin A1c; Future  -     PSA DIAGNOSTIC; Future  -     Lipid Panel; Future    5. Elevated liver enzymes  -     PSA DIAGNOSTIC; Future  -     CBC & Differential; Future  -     Comprehensive Metabolic Panel; Future  -     Hemoglobin A1c; Future  -     PSA DIAGNOSTIC; Future  -     Lipid Panel; Future    6. Vitamin D deficiency  -     PSA DIAGNOSTIC; Future  -     CBC & Differential; Future  -     Comprehensive Metabolic Panel; Future  -     Hemoglobin A1c; Future  -     PSA DIAGNOSTIC; Future  -     Lipid Panel; Future    7. Hypertension, essential  -     PSA DIAGNOSTIC; Future  -     CBC & Differential; Future  -     Comprehensive Metabolic Panel; Future  -     Hemoglobin A1c; Future  -     PSA DIAGNOSTIC; Future  -     Lipid Panel; Future    Other orders  -     doxycycline (VIBRAMYCIN) 100 MG capsule; Take 1 capsule by mouth 2 (Two) Times a Day for 10 days.  Dispense: 28 capsule; Refill: 0        Medicare wellness questions completed January 14, 2025     right total knee arthroplasty with postop infection, treated improved February 2023,    dvt remote 2010, leg left  after left tka ,     Elevated PSA down to 2.5 August 2023--- did see urology Dr. Larose at some point previously, PSA at 3.5 January 2025, up to 7.0 July 9, 2025 would retreat with antibiotics== doxycycline=== for 2 weeks recheck again in 6 weeks if not improved back to Dr. Larose for second opinion MRI etc.,    HTN , CONT IRBESARTAN 300 MG QD, CHLORTHALIDONE 25 MG 1/2 QD , AMLODIPINE 5 MG QHS     Impaired fasting glucose, 6.2 July 9, 2025    Mixed hyperlipidemia continues  Crestor 20 mg daily,    Prior colonoscopy 2018 Dr. Erwin 2 polyps, and May 2021 Dr. Braswell, 4 mm polyp ascending colon internal hemorrhoids and diverticuli otherwise unremarkable,    Previous laparoscopic appendectomy February 2018 for acute appendicitis    Previous left total knee arthroplasty 2010    Vitamin D deficiency---CONT OTC REPLACEMENT                 Follow Up   Return in about 6 months (around 1/14/2026).  Patient was given instructions and counseling regarding his condition or for health maintenance advice. Please see specific information pulled into the AVS if appropriate.           Answers submitted by the patient for this visit:  Chronic Condition Follow-up (Submitted on 7/9/2025)  Chief Complaint: PCP follow-up  other: Yes  Medication compliance: all of the time  Treatment barriers: no complaince problems  Exercise: daily

## (undated) DEVICE — SUTURE RETRIEVER

## (undated) DEVICE — SUT VIC 0 CT1 36IN J946H

## (undated) DEVICE — GLV SURG SENSICARE SLT PF LF 7 STRL

## (undated) DEVICE — DRSNG PAD ABD 8X10IN STRL

## (undated) DEVICE — SST TWIST DRILL, STANDARD, 2.4MM DIA. X 127MM: Brand: MICROAIRE®

## (undated) DEVICE — NO-SCRATCH ™ SMALL WHITNEY CURETTE ™ IS A SINGLE-USE, PLASTIC CURETTE FOR QUICKLY APPLYING, MANIPULATING AND REMOVING BONE CEMENT DURING HIP AND KNEE REPLACEMENT SURGERY. THE PLASTIC IS SOFTER THAN STEEL INSTRUMENTS, REDUCING THE RISK OF DAMAGING THE PROSTHESIS WITH METAL INSTRUMENTS.  THE CURETTE’S 6MM TIP REMOVES EXCESS CEMENT FROM REPLACEMENT HIPS AND KNEES. EASY-TO-MANEUVER, THE SMALL BLUE CURETTE LETS YOU REMOVE CEMENT FROM ALL EDGES OF THE PROSTHESIS.NO-SCRATCH WHITNEY SMALL CURETTE FEATURES:SAFER THAN STEEL- MADE OF PLASTIC - STURDY YET SOFTER THAN SURGICAL STEEL.HANDIER- EACH TOOL HAS A MOLDED-IN THUMB INDENTATION INSTANTLY ORIENTING THE TOOL.- EASIER TO MANEUVER IN HARD TO SEE PLACES.- COLOR-CODED FOR EASY IDENTIFICATION.FASTER- COMES INDIVIDUALLY PACKAGED IN STERILE, PEEL OPEN POUCH, READY TO GO.- APPLIES, MANIPULATES, OR REMOVES CEMENT WITH FINGERTIP PRECISION.ECONOMICAL- THE COST OF A SINGLE REVISION DWARFS THE COST OF A SINGLE-USE CURETTE. - DISPOSABLE – THERE’S NO NEED TO WASTE TIME REMOVING HARDENED CEMENT OR RE-STERILIZING TOOLS.- LESS EXPENSIVE TO BUY AND INVENTORY - ORDER ONLY THE TOOL YOU USE.- PACKAGED 25 INDIVIDUALLY WRAPPED TOOLS TO A CARTON FOR CONVENIENT SHELF STORAGE.: Brand: WHITNEY NO-SCRATCH CURETTE (SMALL)

## (undated) DEVICE — DRSNG GZ PETROLTM XEROFORM CURAD 1X8IN STRL

## (undated) DEVICE — UNDYED BRAIDED (POLYGLACTIN 910), SYNTHETIC ABSORBABLE SUTURE: Brand: COATED VICRYL

## (undated) DEVICE — ENCORE® LATEX ORTHO SIZE 8, STERILE LATEX POWDER-FREE SURGICAL GLOVE: Brand: ENCORE

## (undated) DEVICE — INTENT TO BE USED WITH SUTURE MATERIAL FOR TISSUE CLOSURE: Brand: RICHARD-ALLAN® NEEDLE 1/2 CIRCLE TAPER

## (undated) DEVICE — 3M™ STERI-DRAPE™ U-DRAPE 1015: Brand: STERI-DRAPE™

## (undated) DEVICE — SYR LUERLOK 30CC

## (undated) DEVICE — STRYKER PERFORMANCE SERIES SAGITTAL BLADE: Brand: STRYKER PERFORMANCE SERIES

## (undated) DEVICE — MAT FLR ABS W/BLU/LINER 56X72IN WHT

## (undated) DEVICE — NDL HYPO ECLPS SFTY 18G 1 1/2IN

## (undated) DEVICE — CVR LEG BOOTLEG F/R NOSKID 33IN

## (undated) DEVICE — PULLOVER TOGA, 2X LARGE: Brand: FLYTE, SURGICOOL

## (undated) DEVICE — TOWEL,OR,DSP,ST,BLUE,STD,4/PK,20PK/CS: Brand: MEDLINE

## (undated) DEVICE — 450 ML BOTTLE OF 0.05% CHLORHEXIDINE GLUCONATE IN 99.95% STERILE WATER FOR IRRIGATION, USP AND APPLICATOR.: Brand: IRRISEPT ANTIMICROBIAL WOUND LAVAGE

## (undated) DEVICE — FAN SPRAY KIT: Brand: PULSAVAC®

## (undated) DEVICE — GLV SURG SENSICARE PI PF LF 7 GRN STRL

## (undated) DEVICE — DISPOSABLE TOURNIQUET CUFF SINGLE BLADDER, SINGLE PORT AND QUICK CONNECT CONNECTOR: Brand: COLOR CUFF

## (undated) DEVICE — SCRW HEX PERSONA FML 2.5X25MM PK/2
Type: IMPLANTABLE DEVICE | Site: KNEE | Status: NON-FUNCTIONAL
Removed: 2023-02-21

## (undated) DEVICE — SUT ETHIB 1 X538H ETX538H

## (undated) DEVICE — SLV SCD KN/LEN ADJ EXPRSS BLENDED MD 1P/U

## (undated) DEVICE — SOL IRR NACL 0.9PCT 3000ML

## (undated) DEVICE — GAUZE,SPONGE,4"X4",16PLY,STRL,LF,10/TRAY: Brand: MEDLINE

## (undated) DEVICE — IMMOB KN 3PNL DLX CANVS 24IN BLU

## (undated) DEVICE — DRSNG SURESITE WNDW 4X4.5

## (undated) DEVICE — PROXIMATE RH ROTATING HEAD SKIN STAPLERS (35 WIDE) CONTAINS 35 STAINLESS STEEL STAPLES: Brand: PROXIMATE

## (undated) DEVICE — ELECTRD BLD EDGE COAT 3IN

## (undated) DEVICE — GLV SURG ULTRAFREE MAX LTX PF 8

## (undated) DEVICE — 3 BONE CEMENT MIXER: Brand: MIXEVAC

## (undated) DEVICE — DRP ROBOTIC ROSA BX/20

## (undated) DEVICE — PENCL E/S SMOKEEVAC W/TELESCP CANN

## (undated) DEVICE — BASIC SINGLE BASIN-LF: Brand: MEDLINE INDUSTRIES, INC.

## (undated) DEVICE — INTENDED FOR TISSUE SEPARATION, AND OTHER PROCEDURES THAT REQUIRE A SHARP SURGICAL BLADE TO PUNCTURE OR CUT.: Brand: BARD-PARKER ® CARBON RIB-BACK BLADES

## (undated) DEVICE — PEEL-AWAY TOGA, 2X LARGE: Brand: FLYTE

## (undated) DEVICE — TOTAL KNEE-LF: Brand: MEDLINE INDUSTRIES, INC.

## (undated) DEVICE — APPL CHLORAPREP HI/LITE 26ML ORNG